# Patient Record
Sex: FEMALE | Race: BLACK OR AFRICAN AMERICAN
[De-identification: names, ages, dates, MRNs, and addresses within clinical notes are randomized per-mention and may not be internally consistent; named-entity substitution may affect disease eponyms.]

---

## 2017-01-06 ENCOUNTER — HOSPITAL ENCOUNTER (EMERGENCY)
Dept: HOSPITAL 61 - ER | Age: 42
Discharge: HOME | End: 2017-01-06
Payer: MEDICARE

## 2017-01-06 VITALS
SYSTOLIC BLOOD PRESSURE: 127 MMHG | DIASTOLIC BLOOD PRESSURE: 78 MMHG | DIASTOLIC BLOOD PRESSURE: 78 MMHG | SYSTOLIC BLOOD PRESSURE: 127 MMHG

## 2017-01-06 DIAGNOSIS — Z88.8: ICD-10-CM

## 2017-01-06 DIAGNOSIS — Z86.73: ICD-10-CM

## 2017-01-06 DIAGNOSIS — I10: ICD-10-CM

## 2017-01-06 DIAGNOSIS — E78.00: ICD-10-CM

## 2017-01-06 DIAGNOSIS — R56.9: Primary | ICD-10-CM

## 2017-01-06 DIAGNOSIS — Z88.5: ICD-10-CM

## 2017-01-06 LAB
ANION GAP SERPL CALC-SCNC: 17 MMOL/L (ref 6–14)
BACTERIA #/AREA URNS HPF: (no result) /HPF
BASOPHILS # BLD AUTO: 0.1 X10^3/UL (ref 0–0.2)
BASOPHILS NFR BLD: 1 % (ref 0–3)
BILIRUB UR QL STRIP: NEGATIVE
BUN SERPL-MCNC: 12 MG/DL (ref 7–20)
CALCIUM SERPL-MCNC: 9.3 MG/DL (ref 8.5–10.1)
CHLORIDE SERPL-SCNC: 102 MMOL/L (ref 98–107)
CO2 SERPL-SCNC: 21 MMOL/L (ref 21–32)
CREAT SERPL-MCNC: 1 MG/DL (ref 0.6–1)
EOSINOPHIL NFR BLD: 1 % (ref 0–3)
ERYTHROCYTE [DISTWIDTH] IN BLOOD BY AUTOMATED COUNT: 15.3 % (ref 11.5–14.5)
GFR SERPLBLD BASED ON 1.73 SQ M-ARVRAT: 73.9 ML/MIN
GLUCOSE SERPL-MCNC: 121 MG/DL (ref 70–99)
GLUCOSE UR STRIP-MCNC: NEGATIVE MG/DL
HCT VFR BLD CALC: 36.5 % (ref 36–47)
HGB BLD-MCNC: 11.6 G/DL (ref 12–15.5)
LYMPHOCYTES # BLD: 1.7 X10^3/UL (ref 1–4.8)
LYMPHOCYTES NFR BLD AUTO: 25 % (ref 24–48)
MAGNESIUM SERPL-MCNC: 2.2 MG/DL (ref 1.8–2.4)
MCH RBC QN AUTO: 28 PG (ref 25–35)
MCHC RBC AUTO-ENTMCNC: 32 G/DL (ref 31–37)
MCV RBC AUTO: 89 FL (ref 79–100)
MONOCYTES NFR BLD: 5 % (ref 0–9)
NEG OBC UR: (no result)
NEUTROPHILS NFR BLD AUTO: 68 % (ref 31–73)
NITRITE UR QL STRIP: NEGATIVE
PH UR STRIP: 6 [PH]
PLATELET # BLD AUTO: 445 X10^3/UL (ref 140–400)
POS OBC UR: (no result)
POTASSIUM SERPL-SCNC: 3.6 MMOL/L (ref 3.5–5.1)
PROT UR STRIP-MCNC: 30 MG/DL
RBC # BLD AUTO: 4.09 X10^6/UL (ref 3.5–5.4)
RBC #/AREA URNS HPF: (no result) /HPF (ref 0–2)
SODIUM SERPL-SCNC: 140 MMOL/L (ref 136–145)
SP GR UR STRIP: 1.02
SQUAMOUS #/AREA URNS LPF: (no result) /LPF
UROBILINOGEN UR-MCNC: 0.2 MG/DL
WBC # BLD AUTO: 6.5 X10^3/UL (ref 4–11)
WBC #/AREA URNS HPF: (no result) /HPF (ref 0–4)

## 2017-01-06 PROCEDURE — 99285 EMERGENCY DEPT VISIT HI MDM: CPT

## 2017-01-06 PROCEDURE — 87086 URINE CULTURE/COLONY COUNT: CPT

## 2017-01-06 PROCEDURE — 70450 CT HEAD/BRAIN W/O DYE: CPT

## 2017-01-06 PROCEDURE — 96361 HYDRATE IV INFUSION ADD-ON: CPT

## 2017-01-06 PROCEDURE — 93005 ELECTROCARDIOGRAM TRACING: CPT

## 2017-01-06 PROCEDURE — 85027 COMPLETE CBC AUTOMATED: CPT

## 2017-01-06 PROCEDURE — 81001 URINALYSIS AUTO W/SCOPE: CPT

## 2017-01-06 PROCEDURE — 81025 URINE PREGNANCY TEST: CPT

## 2017-01-06 PROCEDURE — 96374 THER/PROPH/DIAG INJ IV PUSH: CPT

## 2017-01-06 PROCEDURE — 83735 ASSAY OF MAGNESIUM: CPT

## 2017-01-06 PROCEDURE — 36415 COLL VENOUS BLD VENIPUNCTURE: CPT

## 2017-01-06 PROCEDURE — 80048 BASIC METABOLIC PNL TOTAL CA: CPT

## 2017-01-06 NOTE — PHYS DOC
Past Medical History


Past Medical History:  Anxiety, CVA, High Cholesterol, Hypertension, Seizure


Additional Past Medical Histor:  blood clot in brain with pregnancy


Past Surgical History:  Tubal ligation, Other


Additional Past Surgical Histo:  brain surgery 2003


Alcohol Use:  None


Drug Use:  None





Adult General


Chief Complaint


Chief Complaint:  SEIZURE





HPI


HPI


Patient is a 41  year old female who presents status post seizure. Patient is 

accompanied by her sister, who contributes to history. They both work he other; 

patient's sister says that she came into her office but was acting unusually 

and not speaking. She then had a seizure that lasted approximately 30-60 

seconds. Sister called EMS. Patient was then postictal. She does have history 

of seizures, but does not take any antiepileptic medication. Of note, she says 

that she had been taking clonazepam for stress but ran out 1 month ago. While 

she was on the clonazepam she did not have any seizures. Patient says she feels 

generally poor at this time.





Review of Systems


Review of Systems


Constitutional: Fatigue. Denies fever or chills 


Eyes: Denies change in visual acuity or eye pain 


HENT: Denies nasal congestion or sore throat 


Respiratory: Denies cough or shortness of breath 


Cardiovascular: Denies chest pain


GI: Denies abdominal pain, nausea, vomiting, bloody stools or diarrhea 


: Denies dysuria or hematuria 


Musculoskeletal: Denies back pain or joint pain 


Integument: Denies rash or skin lesions 


Neurologic: Seizure, headache. Denies focal weakness or sensory changes





Current Medications


Current Medications





 Current Medications








 Medications


  (Trade)  Dose


 Ordered  Sig/Ronan  Start Time


 Stop Time Status Last Admin


Dose Admin


 


 Acetaminophen


  (Tylenol)  1,000 mg  1X  ONCE  1/6/17 14:30


 1/6/17 14:33 DC 1/6/17 15:13


1,000 MG


 


 Lorazepam


  (Ativan)  1 mg  1X  ONCE  1/6/17 13:45


 1/6/17 13:46 DC 1/6/17 13:43


1 MG


 


 Sodium Chloride


  (Iv Sodium


 Chloride 0.9%


 1000ml Bag)  1,000 ml @ 


 1,000 mls/hr  1X  ONCE  1/6/17 13:45


 1/6/17 14:44 DC 1/6/17 13:42


1,000 MLS/HR











Allergies


Allergies





 Allergies








Coded Allergies Type Severity Reaction Last Updated Verified


 


  metoclopramide Allergy Severe Anaphylaxis 10/29/14 No


 


  morphine Allergy Intermediate itching 10/29/14 Yes











Physical Exam


Physical Exam


Constitutional: Well developed, well nourished, no acute distress, non-toxic 

appearance


HENT: Normocephalic, atraumatic, bilateral external ears normal


Eyes: PERRL, EOMI, conjunctiva normal, no discharge


Neck: Normal range of motion, no stridor


Cardiovascular: Tachycardic, regular rhythm, no murmur 


Lungs & Thorax:  Bilateral breath sounds clear to auscultation 


Abdomen: Bowel sounds normal, soft, non-distended, no TTP


Skin: Warm, dry, no erythema, no rash


Extremities: No obvious deformity, no edema


Neurologic: Alert and oriented X 3, GCS 15, CN II-XII grossly intact, strength 

intact and symmetrical throughout, sensation to light touch intact throughout, 

no dystaxia noted





Current Patient Data


Vital Signs





 Vital Signs








  Date Time  Temp Pulse Resp B/P Pulse Ox O2 Delivery O2 Flow Rate FiO2


 


1/6/17 16:15  88 20 127/78 100   


 


1/6/17 15:27      Room Air  


 


1/6/17 12:50 98.4       





 98.4       








Lab Values





 Laboratory Tests








Test


  1/6/17


13:15 1/6/17


13:39


 


Urine Collection Type Unknown   


 


Urine Color Red   


 


Urine Clarity Cloudy   


 


Urine pH 6.0   


 


Urine Specific Gravity 1.020   


 


Urine Protein


  30mg/dL


(NEG-TRACE) 


 


 


Urine Glucose (UA)


  Negativemg/dL


(NEG) 


 


 


Urine Ketones (Stick) 15mg/dL (NEG)   


 


Urine Blood Large (NEG)   


 


Urine Nitrite


  Negative (NEG)


  


 


 


Urine Bilirubin


  Negative (NEG)


  


 


 


Urine Urobilinogen Dipstick


  0.2mg/dL (0.2


mg/dL) 


 


 


Urine Leukocyte Esterase


  Moderate (NEG)


  


 


 


Urine RBC


  Tntc/HPF (0-2)


  


 


 


Urine WBC


  5-10/HPF (0-4)


  


 


 


Urine Squamous Epithelial


Cells Few/LPF  


  


 


 


Urine Bacteria


  Moderate/HPF


(0-FEW) 


 


 


Urine Mucus Mod/LPF   


 


Urine Pregnancy Test


  Negative (NEG)


  


 


 


White Blood Count


  


  6.5x10^3/uL


(4.0-11.0)


 


Red Blood Count


  


  4.09x10^6/uL


(3.50-5.40)


 


Hemoglobin


  


  11.6g/dL


(12.0-15.5)  L


 


Hematocrit


  


  36.5%


(36.0-47.0)


 


Mean Corpuscular Volume  89fL ()  


 


Mean Corpuscular Hemoglobin  28pg (25-35)  


 


Mean Corpuscular Hemoglobin


Concent 


  32g/dL (31-37)


 


 


Red Cell Distribution Width


  


  15.3%


(11.5-14.5)  H


 


Platelet Count


  


  445x10^3/uL


(140-400)  H


 


Neutrophils (%) (Auto)  68% (31-73)  


 


Lymphocytes (%) (Auto)  25% (24-48)  


 


Monocytes (%) (Auto)  5% (0-9)  


 


Eosinophils (%) (Auto)  1% (0-3)  


 


Basophils (%) (Auto)  1% (0-3)  


 


Neutrophils # (Auto)


  


  4.4x10^3uL


(1.8-7.7)


 


Lymphocytes # (Auto)


  


  1.7x10^3/uL


(1.0-4.8)


 


Monocytes # (Auto)


  


  0.3x10^3/uL


(0.0-1.1)


 


Eosinophils # (Auto)


  


  0.1x10^3/uL


(0.0-0.7)


 


Basophils # (Auto)


  


  0.1x10^3/uL


(0.0-0.2)


 


Sodium Level


  


  140mmol/L


(136-145)


 


Potassium Level


  


  3.6mmol/L


(3.5-5.1)


 


Chloride Level


  


  102mmol/L


()


 


Carbon Dioxide Level


  


  21mmol/L


(21-32)


 


Anion Gap  17 (6-14)  H


 


Blood Urea Nitrogen


  


  12mg/dL (7-20)


 


 


Creatinine


  


  1.0mg/dL


(0.6-1.0)


 


Estimated GFR


(Cockcroft-Gault) 


  73.9  


 


 


Glucose Level


  


  121mg/dL


(70-99)  H


 


Calcium Level


  


  9.3mg/dL


(8.5-10.1)


 


Magnesium Level


  


  2.2mg/dL


(1.8-2.4)





 Laboratory Tests


1/6/17 13:39








 Laboratory Tests


1/6/17 13:39











EKG


EKG


EKG (my read): sinus tachycardia, rate 113, normal axis, no acute ischemic 

changes





Radiology/Procedures


Radiology/Procedures


CT head:


IMPRESSION: Chronic changes. No acute finding. No significant change





Course & Med Decision Making


Course & Med Decision Making


Pertinent Labs and Imaging studies reviewed. (See chart for details)


Patient is 41-year-old female who presents status post seizure. Has history of 

same; may be related to cessation of clonazepam one month ago. Initially post-

ictal, mental status continues to improve while in ED. Will check labs, UA, CT 

head given c/o headache. IVF bolus, dose of ativan ordered. Labs largely 

unremarkable. UA bloody (patient on menstrual cycle) and has WBCs, few bacteria

, leuk esterase; as she does not have any UTI symptoms I will not treat at this 

time and will instead await culture. CT head results as above. I discussed 

results with patient and family; patient feeling much improved at this time 

except for fatigue. Patient discharged home with rx for clonazepam; while she 

was in the ED her sister obtained an appt with her PCP for Monday. Patient 

discharged home with instructions for follow up and return precautions.





Dragon Disclaimer


Dragon Disclaimer


This electronic medical record was generated, in whole or in part, using a 

voice recognition dictation system.





Departure


Departure


Impression:  


 Primary Impression:  


 Seizure


Disposition:  01 HOME, SELF-CARE


Condition:  IMPROVED


Referrals:  


NON,STAFF (PCP)


Patient Instructions:  Seizure, Adult





Additional Instructions:


Thank you for allowing us to provide care today in the Emergency Department.


Take the provided medication as directed. Use caution when taking this 

medication as it can make you drowsy.


Schedule a follow up appointment with your primary care doctor. 


Return promptly to the Emergency Department if you develop any new or 

concerning symptoms.


Scripts


Clonazepam 0.5 Mg Tablet0.5 Mg PO BID #20 TAB


   Prov:MICHAEL MACIAS MD         1/6/17








MICHAEL MACIAS MD Jan 6, 2017 13:27

## 2017-01-06 NOTE — EKG
[facility]

               [address] [place] [address]

Test Date:    2017               Test Time:    13:15:48

Pat Name:     ZACHARIAH BOYD            Department:   

Patient ID:   [id number]           Room:          

Gender:       F                        Technician:   

:          [id number]               Requested By: MICHAEL MACIAS

Order Number: [id number]            Reading MD:   Milagro Page

                                 Measurements

Intervals                              Axis          

Rate:         113                      P:            -100

CO:           90                       QRS:          66

QRSD:         86                       T:            33

QT:           350                                    

QTc:          486                                    

                           Interpretive Statements

SINUS TACHYCARDIA

OTHERWISE NORMAL ECG



Electronically Signed On 2017 8:46:38 CST by Milagro Page

## 2017-01-06 NOTE — RAD
Indication history of a seizure. Headache.



Noncontrast images of the head were obtained. Note is made of a previous

examination 10/29/2014.



The patient is status post left craniotomy. An acute calvarial finding is not

seen. There is an aneurysm clip in the area of the [place] similar to

the previous exam. There is an encephalomalacia area in the left parietal lobe

also similar to the prior study. No subdural or epidural hematoma is seen.

There is no mass or midline shift. An acute finding is not apparent. A

significant change compared to the prior study is not seen.



IMPRESSION: Chronic changes. No acute finding. No significant change













PQRS Compliance Statement:



One or more of the following individualized dose reduction techniques were

utilized for this examination:

1. Automated exposure control

2. Adjustment of the mA and/or kV according to patient size

3. Use of iterative reconstruction technique

## 2017-02-23 ENCOUNTER — HOSPITAL ENCOUNTER (OUTPATIENT)
Dept: HOSPITAL 61 - RT | Age: 42
Discharge: HOME | End: 2017-02-23
Attending: PSYCHIATRY & NEUROLOGY
Payer: MEDICARE

## 2017-02-23 DIAGNOSIS — R56.9: Primary | ICD-10-CM

## 2017-02-23 PROCEDURE — 95816 EEG AWAKE AND DROWSY: CPT

## 2017-03-01 NOTE — EEG
DATE OF SERVICE:  02/23/2017



EEG NUMBER:  



OBJECTIVE:  This is a 41-year-old -American female patient with history

of seizure or seizure-like episodes.  EEG was requested to evaluate seizure

activity.



METHODS:  Twenty electrodes were applied according to the international 10-20

electrode placement system.  EKG monitoring, hyperventilation, intermittent

photic stimulation, monopolar and bipolar montages are routinely utilized.  The

record was obtained on a digital system with video monitoring.



FINDINGS:



1.  Background:  The patient was recorded in awake and drowsy states.  No sleep

state was recorded.  The overall background amplitude is asymmetric 

higher in the left hemispheric area than the right hemispheric area, and it is

about 20-40 microvolts in the left hemispherical area and 10-20 microvolts in

the right hemispherical area.  A posterior dominant rhythm of 8-10 Hz is

observed.



2.  Abnormalities:  No specific epileptiform discharge or electrographic seizure

is seen.  No focal or diffuse slowing.



3.  Activation:  Hyperventilation was performed with good efforts and normal

response.  Intermittent photic stimulation was performed with photic driving. 

No specific epileptiform discharge or electrographic seizure induced by

hyperventilation or intermittent photic stimulation.



IMPRESSION:  This EEG falls into the abnormal category of the study for the

awake and drowsy states.  No sleep state was recorded.  The overall background

amplitude is asymmetric higher in the left hemispheric area than the

right hemispheric area that is in consistent with the patient's history of 

craniotomy.  No specific epileptiform discharge or electrographic seizure is

seen.  

 



______________________________

LOUISE BADILLO MD



DR:  ALICIA/alexandro  JOB#:  [id number] / [id number]

DD:  03/01/2017 13:10  DT:  03/01/2017 21:11

RENETTA

## 2017-07-08 ENCOUNTER — HOSPITAL ENCOUNTER (OUTPATIENT)
Dept: HOSPITAL 61 - ER | Age: 42
Setting detail: OBSERVATION
LOS: 2 days | Discharge: HOME | End: 2017-07-10
Attending: INTERNAL MEDICINE | Admitting: INTERNAL MEDICINE
Payer: MEDICARE

## 2017-07-08 VITALS — HEIGHT: 62 IN | WEIGHT: 293 LBS | BODY MASS INDEX: 53.92 KG/M2

## 2017-07-08 VITALS — DIASTOLIC BLOOD PRESSURE: 82 MMHG | SYSTOLIC BLOOD PRESSURE: 120 MMHG

## 2017-07-08 VITALS — DIASTOLIC BLOOD PRESSURE: 79 MMHG | SYSTOLIC BLOOD PRESSURE: 125 MMHG

## 2017-07-08 VITALS — SYSTOLIC BLOOD PRESSURE: 114 MMHG | DIASTOLIC BLOOD PRESSURE: 79 MMHG

## 2017-07-08 DIAGNOSIS — Z82.49: ICD-10-CM

## 2017-07-08 DIAGNOSIS — Z86.79: ICD-10-CM

## 2017-07-08 DIAGNOSIS — Z86.73: ICD-10-CM

## 2017-07-08 DIAGNOSIS — F41.1: ICD-10-CM

## 2017-07-08 DIAGNOSIS — R53.1: Primary | ICD-10-CM

## 2017-07-08 DIAGNOSIS — E78.00: ICD-10-CM

## 2017-07-08 DIAGNOSIS — I10: ICD-10-CM

## 2017-07-08 DIAGNOSIS — E78.5: ICD-10-CM

## 2017-07-08 DIAGNOSIS — Z83.3: ICD-10-CM

## 2017-07-08 LAB
ALBUMIN SERPL-MCNC: 3.6 G/DL (ref 3.4–5)
ALP SERPL-CCNC: 49 U/L (ref 46–116)
ALT SERPL-CCNC: 22 U/L (ref 14–59)
ANION GAP SERPL CALC-SCNC: 9 MMOL/L (ref 6–14)
APTT BLD: 29 SEC (ref 24–38)
AST SERPL-CCNC: 17 U/L (ref 15–37)
BASOPHILS # BLD AUTO: 0 X10^3/UL (ref 0–0.2)
BASOPHILS NFR BLD: 1 % (ref 0–3)
BILIRUB DIRECT SERPL-MCNC: 0.1 MG/DL (ref 0–0.2)
BILIRUB SERPL-MCNC: 0.2 MG/DL (ref 0.2–1)
BUN SERPL-MCNC: 14 MG/DL (ref 7–20)
CALCIUM SERPL-MCNC: 8.7 MG/DL (ref 8.5–10.1)
CHLORIDE SERPL-SCNC: 106 MMOL/L (ref 98–107)
CO2 SERPL-SCNC: 27 MMOL/L (ref 21–32)
CREAT SERPL-MCNC: 0.9 MG/DL (ref 0.6–1)
EOSINOPHIL NFR BLD: 1 % (ref 0–3)
ERYTHROCYTE [DISTWIDTH] IN BLOOD BY AUTOMATED COUNT: 14 % (ref 11.5–14.5)
FIBRINOGEN PPP-MCNC: 346 MG/DL (ref 200–440)
GFR SERPLBLD BASED ON 1.73 SQ M-ARVRAT: 83.5 ML/MIN
GLUCOSE SERPL-MCNC: 133 MG/DL (ref 70–99)
HCT VFR BLD CALC: 33.7 % (ref 36–47)
HGB BLD-MCNC: 11.4 G/DL (ref 12–15.5)
INR PPP: 1 (ref 0.8–1.1)
LYMPHOCYTES # BLD: 1 X10^3/UL (ref 1–4.8)
LYMPHOCYTES NFR BLD AUTO: 20 % (ref 24–48)
MCH RBC QN AUTO: 29 PG (ref 25–35)
MCHC RBC AUTO-ENTMCNC: 34 G/DL (ref 31–37)
MCV RBC AUTO: 87 FL (ref 79–100)
MONOCYTES NFR BLD: 6 % (ref 0–9)
NEUTROPHILS NFR BLD AUTO: 73 % (ref 31–73)
PLATELET # BLD AUTO: 467 X10^3/UL (ref 140–400)
POTASSIUM SERPL-SCNC: 3.6 MMOL/L (ref 3.5–5.1)
PROT SERPL-MCNC: 7.6 G/DL (ref 6.4–8.2)
PROTHROMBIN TIME: 12.8 SEC (ref 11.7–14)
RBC # BLD AUTO: 3.88 X10^6/UL (ref 3.5–5.4)
SODIUM SERPL-SCNC: 142 MMOL/L (ref 136–145)
WBC # BLD AUTO: 5.2 X10^3/UL (ref 4–11)

## 2017-07-08 PROCEDURE — G0378 HOSPITAL OBSERVATION PER HR: HCPCS

## 2017-07-08 PROCEDURE — 70551 MRI BRAIN STEM W/O DYE: CPT

## 2017-07-08 PROCEDURE — 82962 GLUCOSE BLOOD TEST: CPT

## 2017-07-08 PROCEDURE — 96374 THER/PROPH/DIAG INJ IV PUSH: CPT

## 2017-07-08 PROCEDURE — G0379 DIRECT REFER HOSPITAL OBSERV: HCPCS

## 2017-07-08 PROCEDURE — 93005 ELECTROCARDIOGRAM TRACING: CPT

## 2017-07-08 PROCEDURE — 36415 COLL VENOUS BLD VENIPUNCTURE: CPT

## 2017-07-08 PROCEDURE — 70450 CT HEAD/BRAIN W/O DYE: CPT

## 2017-07-08 PROCEDURE — 96361 HYDRATE IV INFUSION ADD-ON: CPT

## 2017-07-08 PROCEDURE — 95816 EEG AWAKE AND DROWSY: CPT

## 2017-07-08 PROCEDURE — 85730 THROMBOPLASTIN TIME PARTIAL: CPT

## 2017-07-08 PROCEDURE — 96375 TX/PRO/DX INJ NEW DRUG ADDON: CPT

## 2017-07-08 PROCEDURE — 70544 MR ANGIOGRAPHY HEAD W/O DYE: CPT

## 2017-07-08 PROCEDURE — 99285 EMERGENCY DEPT VISIT HI MDM: CPT

## 2017-07-08 PROCEDURE — 80076 HEPATIC FUNCTION PANEL: CPT

## 2017-07-08 PROCEDURE — 97161 PT EVAL LOW COMPLEX 20 MIN: CPT

## 2017-07-08 PROCEDURE — 80048 BASIC METABOLIC PNL TOTAL CA: CPT

## 2017-07-08 PROCEDURE — 85384 FIBRINOGEN ACTIVITY: CPT

## 2017-07-08 PROCEDURE — 85027 COMPLETE CBC AUTOMATED: CPT

## 2017-07-08 PROCEDURE — 85610 PROTHROMBIN TIME: CPT

## 2017-07-08 RX ADMIN — LISINOPRIL SCH MG: 10 TABLET ORAL at 17:46

## 2017-07-08 RX ADMIN — ACETAMINOPHEN PRN MG: 500 TABLET ORAL at 20:35

## 2017-07-08 NOTE — RAD
CT head without contrast    



History: Right-sided numbness, speech difficulty



Comparison: 1/6/2017.



Procedure: Axial images are obtained of the head from the skull base through

the vertex without IV contrast.





Findings: Changes of left parietal craniotomy. Aneurysm clip identified in the

region of [place] similar to prior exam. Small focus of

encephalomalacia identified in the left parietal lobe similar to prior exam. 

The ventricles and sulci are normal for the patient's age. No mass-effect,

intracranial mass, midline shift, hemorrhage or obvious acute infarction is

identified.  Basilar cisterns are patent.  . The visualized paranasal sinuses

appear clear.    



Impression: 



    1. No acute intracranial process.     



ER physician informed at time of dictation.



PQRS Compliance Statement:



One or more of the following individualized dose reduction techniques were

utilized for this examination:

1. Automated exposure control

2. Adjustment of the mA and/or kV according to patient size

3. Use of iterative reconstruction technique faint

## 2017-07-08 NOTE — PDOC1
History and Physical


Date of Admission


Date of Admission


DATE: 7/8/17 


TIME: 19:46





History of Present Illness


History of Present Illness


Young female with prior h/o CVA after childberth. She has a coil.


Now co R weakness. ?New CVA?


DW ER doc


Reviewed chart and labs and imaging.


Pt seen and examined


Plan is to admit for neuro consult and possibly more imaging


Dictation still broken


Total time 34 minutes





Past medical history


1. Generalized anxiety disorder


2. History of stroke


3. Hyperlipidemia


4. Hypertension


5. Seizure


6. History of blood clot associated with childbirth


7. Tubal ligation


8. Brain surgery 2003


9. Suicidal thoughts associated with taking Keppra





Current Problem List


Problem List


Problems


Medical Problems:


(1) Right sided weakness


Status: Acute  








Problems:  





Current Medications


Current Medications





Current Medications


Ondansetron HCl (Zofran) 4 mg 1X  ONCE IV  Last administered on 7/8/17at 11:57;

  Start 7/8/17 at 11:30;  Stop 7/8/17 at 11:31;  Status DC


Sodium Chloride 1,000 ml @  1,000 mls/hr 1X  ONCE IV  Last administered on 7/8/ 17at 11:57;  Start 7/8/17 at 11:30;  Stop 7/8/17 at 12:29;  Status DC


Ondansetron HCl (Zofran) 4 mg PRN Q8HRS  PRN IV NAUSEA/VOMITING;  Start 7/8/17 

at 12:45;  Stop 7/9/17 at 12:44


Aspirin (Amanda Aspirin) 325 mg 1X  ONCE PO  Last administered on 7/8/17at 13:03

;  Start 7/8/17 at 12:50;  Stop 7/8/17 at 12:51;  Status DC


Aspirin (Ecotrin) 81 mg DAILY PO ;  Start 7/9/17 at 09:00


Clonazepam (KlonoPIN) 0.5 mg BID PO  Last administered on 7/8/17at 17:46;  

Start 7/8/17 at 17:30


Fluoxetine HCl (PROzac) 60 mg DAILY PO  Last administered on 7/8/17at 17:46;  

Start 7/8/17 at 17:30


Lisinopril (Prinivil) 10 mg DAILY PO  Last administered on 7/8/17at 17:46;  

Start 7/8/17 at 17:30


Clonidine HCl (Catapres Tts-2) 1 patch WEEKLY TD ;  Start 7/15/17 at 09:00





Active Scripts


Active


Clonazepam 0.5 Mg Tablet 0.5 Mg PO BID


Reported


Clonidine Tts-2 (Clonidine) 1 Each Patch.tdwk 1 Patch TD WEEKLY


Aspir 81 (Aspirin) 81 Mg Tablet.dr 1 Tab PO DAILY


Lisinopril 10 Mg Tablet 1 Tab PO DAILY


Fluoxetine Hcl 20 Mg Capsule 60 Mg PO DAILY





Allergies


Allergies:  


Coded Allergies:  


     metoclopramide (Unverified  Allergy, Severe, Anaphylaxis, 10/29/14)


     morphine (Verified  Allergy, Intermediate, itching, 10/29/14)





Vitals


Vitals





Vital Signs








  Date Time  Temp Pulse Resp B/P (MAP) Pulse Ox O2 Delivery O2 Flow Rate FiO2


 


7/8/17 17:46  84  120/82    


 


7/8/17 15:04      Room Air  


 


7/8/17 13:45 99.9  16  100   





 99.9       











Labs


Labs





Laboratory Tests








Test


  7/8/17


11:20 7/8/17


11:42


 


White Blood Count


  5.2 x10^3/uL


(4.0-11.0) 


 


 


Red Blood Count


  3.88 x10^6/uL


(3.50-5.40) 


 


 


Hemoglobin


  11.4 g/dL


(12.0-15.5) 


 


 


Hematocrit


  33.7 %


(36.0-47.0) 


 


 


Mean Corpuscular Volume 87 fL ()  


 


Mean Corpuscular Hemoglobin 29 pg (25-35)  


 


Mean Corpuscular Hemoglobin


Concent 34 g/dL


(31-37) 


 


 


Red Cell Distribution Width


  14.0 %


(11.5-14.5) 


 


 


Platelet Count


  467 x10^3/uL


(140-400) 


 


 


Neutrophils (%) (Auto) 73 % (31-73)  


 


Lymphocytes (%) (Auto) 20 % (24-48)  


 


Monocytes (%) (Auto) 6 % (0-9)  


 


Eosinophils (%) (Auto) 1 % (0-3)  


 


Basophils (%) (Auto) 1 % (0-3)  


 


Neutrophils # (Auto)


  3.8 x10^3uL


(1.8-7.7) 


 


 


Lymphocytes # (Auto)


  1.0 x10^3/uL


(1.0-4.8) 


 


 


Monocytes # (Auto)


  0.3 x10^3/uL


(0.0-1.1) 


 


 


Eosinophils # (Auto)


  0.0 x10^3/uL


(0.0-0.7) 


 


 


Basophils # (Auto)


  0.0 x10^3/uL


(0.0-0.2) 


 


 


Prothrombin Time


  12.8 SEC


(11.7-14.0) 


 


 


Prothromb Time International


Ratio 1.0 (0.8-1.1) 


  


 


 


Activated Partial


Thromboplast Time 29 SEC (24-38) 


  


 


 


Fibrinogen


  346 mg/dL


(200-440) 


 


 


Sodium Level


  142 mmol/L


(136-145) 


 


 


Potassium Level


  3.6 mmol/L


(3.5-5.1) 


 


 


Chloride Level


  106 mmol/L


() 


 


 


Carbon Dioxide Level


  27 mmol/L


(21-32) 


 


 


Anion Gap 9 (6-14)  


 


Blood Urea Nitrogen


  14 mg/dL


(7-20) 


 


 


Creatinine


  0.9 mg/dL


(0.6-1.0) 


 


 


Estimated GFR


(Cockcroft-Gault) 83.5 


  


 


 


Glucose Level


  133 mg/dL


(70-99) 


 


 


Calcium Level


  8.7 mg/dL


(8.5-10.1) 


 


 


Total Bilirubin


  0.2 mg/dL


(0.2-1.0) 


 


 


Direct Bilirubin


  0.1 mg/dL


(0.0-0.2) 


 


 


Aspartate Amino Transf


(AST/SGOT) 17 U/L (15-37) 


  


 


 


Alanine Aminotransferase


(ALT/SGPT) 22 U/L (14-59) 


  


 


 


Alkaline Phosphatase


  49 U/L


() 


 


 


Total Protein


  7.6 g/dL


(6.4-8.2) 


 


 


Albumin


  3.6 g/dL


(3.4-5.0) 


 


 


Glucose (Fingerstick)


  


  115 mg/dL


(70-99)








Laboratory Tests








Test


  7/8/17


11:20 7/8/17


11:42


 


White Blood Count


  5.2 x10^3/uL


(4.0-11.0) 


 


 


Red Blood Count


  3.88 x10^6/uL


(3.50-5.40) 


 


 


Hemoglobin


  11.4 g/dL


(12.0-15.5) 


 


 


Hematocrit


  33.7 %


(36.0-47.0) 


 


 


Mean Corpuscular Volume 87 fL ()  


 


Mean Corpuscular Hemoglobin 29 pg (25-35)  


 


Mean Corpuscular Hemoglobin


Concent 34 g/dL


(31-37) 


 


 


Red Cell Distribution Width


  14.0 %


(11.5-14.5) 


 


 


Platelet Count


  467 x10^3/uL


(140-400) 


 


 


Neutrophils (%) (Auto) 73 % (31-73)  


 


Lymphocytes (%) (Auto) 20 % (24-48)  


 


Monocytes (%) (Auto) 6 % (0-9)  


 


Eosinophils (%) (Auto) 1 % (0-3)  


 


Basophils (%) (Auto) 1 % (0-3)  


 


Neutrophils # (Auto)


  3.8 x10^3uL


(1.8-7.7) 


 


 


Lymphocytes # (Auto)


  1.0 x10^3/uL


(1.0-4.8) 


 


 


Monocytes # (Auto)


  0.3 x10^3/uL


(0.0-1.1) 


 


 


Eosinophils # (Auto)


  0.0 x10^3/uL


(0.0-0.7) 


 


 


Basophils # (Auto)


  0.0 x10^3/uL


(0.0-0.2) 


 


 


Prothrombin Time


  12.8 SEC


(11.7-14.0) 


 


 


Prothromb Time International


Ratio 1.0 (0.8-1.1) 


  


 


 


Activated Partial


Thromboplast Time 29 SEC (24-38) 


  


 


 


Fibrinogen


  346 mg/dL


(200-440) 


 


 


Sodium Level


  142 mmol/L


(136-145) 


 


 


Potassium Level


  3.6 mmol/L


(3.5-5.1) 


 


 


Chloride Level


  106 mmol/L


() 


 


 


Carbon Dioxide Level


  27 mmol/L


(21-32) 


 


 


Anion Gap 9 (6-14)  


 


Blood Urea Nitrogen


  14 mg/dL


(7-20) 


 


 


Creatinine


  0.9 mg/dL


(0.6-1.0) 


 


 


Estimated GFR


(Cockcroft-Gault) 83.5 


  


 


 


Glucose Level


  133 mg/dL


(70-99) 


 


 


Calcium Level


  8.7 mg/dL


(8.5-10.1) 


 


 


Total Bilirubin


  0.2 mg/dL


(0.2-1.0) 


 


 


Direct Bilirubin


  0.1 mg/dL


(0.0-0.2) 


 


 


Aspartate Amino Transf


(AST/SGOT) 17 U/L (15-37) 


  


 


 


Alanine Aminotransferase


(ALT/SGPT) 22 U/L (14-59) 


  


 


 


Alkaline Phosphatase


  49 U/L


() 


 


 


Total Protein


  7.6 g/dL


(6.4-8.2) 


 


 


Albumin


  3.6 g/dL


(3.4-5.0) 


 


 


Glucose (Fingerstick)


  


  115 mg/dL


(70-99)











VTE Prophylaxis Ordered


VTE Prophylaxis Devices:  Yes


VTE Pharmacological Prophylaxi:  Yes











BEVERLY CONROY III, DO Jul 8, 2017 19:48

## 2017-07-08 NOTE — PHYS DOC
Past Medical History


Past Medical History:  Anxiety, CVA, High Cholesterol, Hypertension, Seizure


Additional Past Medical Histor:  blood clot in brain with pregnancy


Past Surgical History:  Tubal ligation, Other


Additional Past Surgical Histo:  brain surgery 


Alcohol Use:  None


Drug Use:  None





Adult General


Chief Complaint


Chief Complaint:  NEURO SYMPTOMS/DEFICITS





[facility]


HPI





Patient is a 41  year old -American American female who presents with 

with right-sided facial numbness, right arm and leg numbness and weakness. She 

states it all started around 5 PM last night. She states been persistent. She 

also states she's been feeling nauseated and lightheaded and dizzy. She denies 

any vertigo type symptoms. She states last night she did have some vertigo 

symptoms but not today. She's also been having some loose stools and had 3 or 4 

nonbloody loose stools this morning. She states that she is on 81 mg aspirin a 

day sure she took it this morning. She states she's had a blood clot in her 

brain before and had have a craniotomy this was approximately 12-13 years ago 

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 She also 

states last year she had a seizure that was related to stress. She states she 

seen Dr. aWrren last her for her seizure. She denies any headache, neck stiffness, 

fever or chills abdominal pain.





Review of Systems


Review of Systems





Constitutional: Denies fever or chills []


Eyes: Denies change in visual acuity, redness, or eye pain []


HENT: Denies nasal congestion or sore throat []


Respiratory: Denies cough or shortness of breath []


Cardiovascular: No additional information not addressed in HPI []


GI: Denies abdominal pain, nausea, vomiting, bloody stools or diarrhea []


: Denies dysuria or hematuria []


Musculoskeletal: Denies back pain or joint pain []


Integument: Denies rash or skin lesions []


Neurologic: Denies headache, positive for right-sided weakness and decreased 

sensation


Endocrine: Denies polyuria or polydipsia []





Current Medications


Current Medications





Current Medications








 Medications


  (Trade)  Dose


 Ordered  Sig/Ronan  Start Time


 Stop Time Status Last Admin


Dose Admin


 


 Ondansetron HCl


  (Zofran)  4 mg  1X  ONCE  17 11:30


 17 11:31 DC 17 11:57


4 MG


 


 Sodium Chloride  1,000 ml @ 


 1,000 mls/hr  1X  ONCE  17 11:30


 17 12:29 DC 17 11:57


1,000 MLS/HR











Allergies


Allergies





Allergies








Coded Allergies Type Severity Reaction Last Updated Verified


 


  metoclopramide Allergy Severe Anaphylaxis 10/29/14 No


 


  morphine Allergy Intermediate itching 10/29/14 Yes











Physical Exam


Physical Exam





Constitutional: Well developed, well nourished, no acute distress, non-toxic 

appearance. []


HENT: Normocephalic, atraumatic, bilateral external ears normal, oropharynx 

moist, no oral exudates, nose normal. []


Eyes: PERRLA, EOMI, conjunctiva normal, no discharge. [] 


Neck: Normal range of motion, no tenderness, supple, no stridor. [] 


Cardiovascular:Heart rate regular rhythm, no murmur []


Lungs & Thorax:  Bilateral breath sounds clear to auscultation []


Abdomen: Bowel sounds normal, soft, no tenderness, no masses, no pulsatile 

masses. [] 


Skin: Warm, dry, no erythema, no rash. [] 


Back: No tenderness, no CVA tenderness. [] 


Extremities: No tenderness, no cyanosis, no clubbing, ROM intact, no edema. [] 


Neurologic: Alert and oriented X 3, 4-5 strength in the right upper and lower 

extremity, decreased sensation to the right upper and lower extremity and face


Psychologic: Affect normal, judgement normal, mood normal. []





Current Patient Data


Vital Signs





 Vital Signs








  Date Time  Temp Pulse Resp B/P (MAP) Pulse Ox O2 Delivery O2 Flow Rate FiO2


 


17 11:05 99.2 114 16 146/85 (105) 98 Room Air  





 99.2       








Lab Values





 Laboratory Tests








Test


  17


11:20 17


11:42


 


White Blood Count


  5.2 x10^3/uL


(4.0-11.0) 


 


 


Red Blood Count


  3.88 x10^6/uL


(3.50-5.40) 


 


 


Hemoglobin


  11.4 g/dL


(12.0-15.5)  L 


 


 


Hematocrit


  33.7 %


(36.0-47.0)  L 


 


 


Mean Corpuscular Volume


  87 fL ()


  


 


 


Mean Corpuscular Hemoglobin 29 pg (25-35)   


 


Mean Corpuscular Hemoglobin


Concent 34 g/dL


(31-37) 


 


 


Red Cell Distribution Width


  14.0 %


(11.5-14.5) 


 


 


Platelet Count


  467 x10^3/uL


(140-400)  H 


 


 


Neutrophils (%) (Auto) 73 % (31-73)   


 


Lymphocytes (%) (Auto) 20 % (24-48)  L 


 


Monocytes (%) (Auto) 6 % (0-9)   


 


Eosinophils (%) (Auto) 1 % (0-3)   


 


Basophils (%) (Auto) 1 % (0-3)   


 


Neutrophils # (Auto)


  3.8 x10^3uL


(1.8-7.7) 


 


 


Lymphocytes # (Auto)


  1.0 x10^3/uL


(1.0-4.8) 


 


 


Monocytes # (Auto)


  0.3 x10^3/uL


(0.0-1.1) 


 


 


Eosinophils # (Auto)


  0.0 x10^3/uL


(0.0-0.7) 


 


 


Basophils # (Auto)


  0.0 x10^3/uL


(0.0-0.2) 


 


 


Prothrombin Time


  12.8 SEC


(11.7-14.0) 


 


 


Prothrombin Time INR 1.0 (0.8-1.1)   


 


PTT


  29 SEC (24-38)


  


 


 


Fibrinogen


  346 mg/dL


(200-440) 


 


 


Sodium Level


  142 mmol/L


(136-145) 


 


 


Potassium Level


  3.6 mmol/L


(3.5-5.1) 


 


 


Chloride Level


  106 mmol/L


() 


 


 


Carbon Dioxide Level


  27 mmol/L


(21-32) 


 


 


Anion Gap 9 (6-14)   


 


Blood Urea Nitrogen


  14 mg/dL


(7-20) 


 


 


Creatinine


  0.9 mg/dL


(0.6-1.0) 


 


 


Estimated GFR


(Cockcroft-Gault) 83.5  


  


 


 


Glucose Level


  133 mg/dL


(70-99)  H 


 


 


Calcium Level


  8.7 mg/dL


(8.5-10.1) 


 


 


Total Bilirubin


  0.2 mg/dL


(0.2-1.0) 


 


 


Direct Bilirubin


  0.1 mg/dL


(0.0-0.2) 


 


 


Aspartate Amino Transferase


(AST) 17 U/L (15-37)


  


 


 


Alanine Aminotransferase (ALT)


  22 U/L (14-59)


  


 


 


Alkaline Phosphatase


  49 U/L


() 


 


 


Total Protein


  7.6 g/dL


(6.4-8.2) 


 


 


Albumin


  3.6 g/dL


(3.4-5.0) 


 


 


Glucose (Fingerstick)


  


  115 mg/dL


(70-99)  H





 Laboratory Tests


17 11:20








 Laboratory Tests


17 11:20











EKG


EKG


EKG shows sinus rhythm with rate of 99 bpm without any ST elevations or T-wave 

inversions appreciated, normal axis,  ms, as interpreted by me.





Radiology/Procedures


Radiology/Procedures


 [facility]


 [address] [place] [address]


 [phone]


 


 IMAGING REPORT





 Signed





PATIENT: ZACHARIAH BOYD ACCOUNT: [id number] MRN#: [id number]


: 1975 LOCATION: ER AGE: 41


SEX: F EXAM DT: 17 ACCESSION#: [id number]


STATUS: PRE ER ORD. PHYSICIAN: RASTA VALDEZ MD 


REASON: stroke protocol


PROCEDURE: CT CODE STROKE HEAD WO








CT head without contrast    





History: Right-sided numbness, speech difficulty





Comparison: 2017.





Procedure: Axial images are obtained of the head from the skull base through


the vertex without IV contrast.








Findings: Changes of left parietal craniotomy. Aneurysm clip identified in the


region of [place] similar to prior exam. Small focus of


encephalomalacia identified in the left parietal lobe similar to prior exam. 


The ventricles and sulci are normal for the patient's age. No mass-effect,


intracranial mass, midline shift, hemorrhage or obvious acute infarction is


identified.  Basilar cisterns are patent.  . The visualized paranasal sinuses


appear clear.    





Impression: 





    1. No acute intracranial process.     





ER physician informed at time of dictation.





PQRS Compliance Statement:





One or more of the following individualized dose reduction techniques were


utilized for this examination:


1. Automated exposure control


2. Adjustment of the mA and/or kV according to patient size


3. Use of iterative reconstruction technique faint














DICTATED and SIGNED BY:     GAYLE LOWE MD


DATE:     17 1140





CC: ANDERSON DILLARD; RASTA VALDEZ MD ~


Impressions:


Right arm, leg weakness


Right Arm and leg decreased sensation





Course & Med Decision Making


Course & Med Decision Making


Pertinent Labs and Imaging studies reviewed. (See chart for details)








CT scan of the head did not show any acute abnormality's. Labs are nonacute. We 

are waiting a urine at this time. Spoke with Dr. Hanna with neurology and 

informed him of her symptoms, duration of symptoms since 5 PM, her past medical 

history, her previous craniotomy and coiling. I have started the patient on 325 

aspirin and ordered an MRI of her brain. She's being admitted to Dr. Kessler in 

stable condition at this time. Interim orders have been written.





Dragon Disclaimer


Dragon Disclaimer


This electronic medical record was generated, in whole or in part, using a 

voice recognition dictation system.





Departure


Departure


Impression:  


 Primary Impression:  


 Right sided weakness


Disposition:   ADMITTED AS INPATIENT


Admitting Physician:  Jamal Kessler


Condition:  STABLE


Referrals:  


ANDERSON DILLARD (PCP)











RASTA VALDEZ MD 2017 11:06

## 2017-07-08 NOTE — EKG
[facility]

              [address] [place] [address]

Test Date:    2017               Test Time:    11:21:25

Pat Name:     ZACHARIAH BOYD            Department:   

Patient ID:   [id number]           Room:          

Gender:       F                        Technician:   

:          1975               Requested By: RASTA VALDEZ

Order Number: [id number]            Reading MD:     

                                 Measurements

Intervals                              Axis          

Rate:         99                       P:            41

MA:           106                      QRS:          47

QRSD:         80                       T:            47

QT:           318                                    

QTc:          413                                    

                           Interpretive Statements

SINUS RHYTHM

NON SPECIFIC T ABNORMALITY

RI6.01          Unconfirmed report

No previous ECG available for comparison

## 2017-07-08 NOTE — PDOC2
NEUROLOGY CONSULT


Date of Admission


Date of Admission


DATE: 7/8/17 


TIME: 15:40








Reason for consultation


New onset right-sided weakness and numbness





History of present illness


Nayely Hernandez is a pleasant 41-year-old woman who had a hemorrhagic stroke 14 

years ago with the birth of her son. This was in the left hemisphere and caused 

right-sided symptoms which eventually resolved. She reports she had a 

craniotomy. She also had an aneurysm coil but it sounds like he did not 

rupture. She is done fairly well in the interim. Yesterday she was driving and 

became very dizzy. She stopped at [employer]. She felt like she would 

almost faint. She went into the store and noticed her right side was numb. This 

lasted all night long. She was nauseated and having a spinning sensation. The 

numbness persisted today and she was having diarrhea as well. This has not been 

associated with any fever. Several days ago she woke up feeling like she was 

having a seizure. She previously was evaluated by Dr. Warren, a neurologist, where 

an EEG was negative. She was placed on levetiracetam but experienced adverse 

effects with suicidal thoughts so discontinued the medication. It is not clear 

that these actually had a definite seizure. She describes spells at night rib 

feels like her eyes are jerky but she is awake during the spell. She's also 

been experiencing daily headache although does not have much of a headache 

today. She's had palpitations and then has anxiety attacks. She is been using 

Klonopin on a daily basis but the anxiety is not controlled.





Past medical history


1. Generalized anxiety disorder


2. History of stroke


3. Hyperlipidemia


4. Hypertension


5. Seizure


6. History of blood clot associated with childbirth


7. Tubal ligation


8. Brain surgery 2003


9. Suicidal thoughts associated with taking Keppra





Family history


Hypertension, high cholesterol, cancer and diabetes run in the family.





Social history


She has a boyfriend at bedside with him she's been with for 2 months. She has 3 

sons age 21 down to 14 years. Her 18-year-old son is preparing to go to 

college. Her 21-year-old son works and goes to school. She is a lifelong 

nonsmoker. She drinks occasional glasses of wine. She denies recreational 

drugs. She does not work and is on disability.





Current Medications


Current Medications





Current Medications


Ondansetron HCl (Zofran) 4 mg 1X  ONCE IV  Last administered on 7/8/17at 11:57;

  Start 7/8/17 at 11:30;  Stop 7/8/17 at 11:31;  Status DC


Sodium Chloride 1,000 ml @  1,000 mls/hr 1X  ONCE IV  Last administered on 7/8/ 17at 11:57;  Start 7/8/17 at 11:30;  Stop 7/8/17 at 12:29;  Status DC


Ondansetron HCl (Zofran) 4 mg PRN Q8HRS  PRN IV NAUSEA/VOMITING;  Start 7/8/17 

at 12:45;  Stop 7/9/17 at 12:44


Aspirin (Amanda Aspirin) 325 mg 1X  ONCE PO  Last administered on 7/8/17at 13:03

;  Start 7/8/17 at 12:50;  Stop 7/8/17 at 12:51;  Status DC





Active Scripts


Active


Clonazepam 0.5 Mg Tablet 0.5 Mg PO BID


Reported


Aspir 81 (Aspirin) 81 Mg Tablet.dr 1 Tab PO DAILY


Lisinopril 10 Mg Tablet 1 Tab PO DAILY


Fluoxetine Hcl 20 Mg Capsule 60 Mg PO DAILY


Clonidine Hcl 0.1 Mg Tablet 0.2 Mg TOP CHANGE EVERY MONDAY





Allergies


Allergies:  


Coded Allergies:  


     metoclopramide (Unverified  Allergy, Severe, Anaphylaxis, 10/29/14)


     morphine (Verified  Allergy, Intermediate, itching, 10/29/14)





ROS


Review of System


She has been experiencing daily headache although does not have a headache 

today. She's had dizziness. There's been no loss of hearing or vision. She does 

not have nose or sinus difficulty. She feels she is having trouble getting out 

her thoughts. She's been able to eat and swallow. She did have diarrhea 

earlier. She's had some nausea. She has not had shortness of breath, cough or 

cold. There is no chest or abdominal pain. She does not have any bone or joint 

pain. She has not had fever or rash. She does not have any genitourinary 

complaints. She complains of right-sided numbness involving face, arm and leg. 

She feels her right side is weakened. She has anxiety and panic attacks. She 

has been under much more stress lately. She does not complain of swelling, 

bruising or bleeding.





Physical Exam


Physical Examination


She was alert, awake and cooperative. The speech was fluent and clear. She had 

intermittent difficulty getting out her thoughts but other time she was quite 

fluent.    She had a good fund of recent and remote knowledge. Attention and 

concentration was intact. She was well-groomed and well-nourished. She was 

fully oriented. Examination of the cranial nerves revealed visual fields were 

full to confrontation. Extraocular movements were intact. The eyes were 

conjugate. Pursuit movements were smooth and saccadic movements were without 

dysmetria. The pupils were 4 millimeters and reacted to light. There was no 

afferent pupillary defect. Funduscopic examination did not reveal papilledema, 

exudate or hemorrhage. Facial sensation was intact. The muscles of mastication 

and facial expression were powerful symmetrically. Hearing was intact to finger 

rub. The palate arch symmetrically and the tongue was midline with full range 

of motion. Sternocleidomastoid and trapezius were powerful bilaterally. Muscle 

bulk and tone was normal. There was no arm drift or abnormal movement. The 

power was full and symmetric in the upper and lower extremities with give way 

weakness on the right side. The power fluctuated greatly on the right side. 

Reflexes were 2/4 and symmetric in the upper and lower extremities. The toes 

were downgoing bilaterally. Coordination testing with finger to nose, heel to 

shin, fine motor and rapid alternating movements was well performed except 

right heel-to-shin was diminished. The sensory examination was intact to pain, 

light touch, proprioception, graphesthesia, cold thermal and vibration except 

for subjective sensory shading on the right. There was no extinction to double 

simultaneous stimulation. The gait was of a normal base and unsteady walk. She 

was able to heel, toe, and tandem walk. The Romberg stance was negative. 

Auscultation of the carotid arteries did not reveal a bruit. Heart rhythm was 

regular without a murmur. Peripheral pulses are 2/4 at the wrists and feet. 

There was no edema or cyanosis of the extremities.





Vitals


VITALS





Vital Signs








  Date Time  Temp Pulse Resp B/P (MAP) Pulse Ox O2 Delivery O2 Flow Rate FiO2


 


7/8/17 15:04      Room Air  


 


7/8/17 13:45 99.9 84 16 120/82 (95) 100   





 99.9       











Labs


Labs





Laboratory Tests








Test


  7/8/17


11:20 7/8/17


11:42


 


White Blood Count


  5.2 x10^3/uL


(4.0-11.0) 


 


 


Red Blood Count


  3.88 x10^6/uL


(3.50-5.40) 


 


 


Hemoglobin


  11.4 g/dL


(12.0-15.5) 


 


 


Hematocrit


  33.7 %


(36.0-47.0) 


 


 


Mean Corpuscular Volume 87 fL ()  


 


Mean Corpuscular Hemoglobin 29 pg (25-35)  


 


Mean Corpuscular Hemoglobin


Concent 34 g/dL


(31-37) 


 


 


Red Cell Distribution Width


  14.0 %


(11.5-14.5) 


 


 


Platelet Count


  467 x10^3/uL


(140-400) 


 


 


Neutrophils (%) (Auto) 73 % (31-73)  


 


Lymphocytes (%) (Auto) 20 % (24-48)  


 


Monocytes (%) (Auto) 6 % (0-9)  


 


Eosinophils (%) (Auto) 1 % (0-3)  


 


Basophils (%) (Auto) 1 % (0-3)  


 


Neutrophils # (Auto)


  3.8 x10^3uL


(1.8-7.7) 


 


 


Lymphocytes # (Auto)


  1.0 x10^3/uL


(1.0-4.8) 


 


 


Monocytes # (Auto)


  0.3 x10^3/uL


(0.0-1.1) 


 


 


Eosinophils # (Auto)


  0.0 x10^3/uL


(0.0-0.7) 


 


 


Basophils # (Auto)


  0.0 x10^3/uL


(0.0-0.2) 


 


 


Prothrombin Time


  12.8 SEC


(11.7-14.0) 


 


 


Prothromb Time International


Ratio 1.0 (0.8-1.1) 


  


 


 


Activated Partial


Thromboplast Time 29 SEC (24-38) 


  


 


 


Fibrinogen


  346 mg/dL


(200-440) 


 


 


Sodium Level


  142 mmol/L


(136-145) 


 


 


Potassium Level


  3.6 mmol/L


(3.5-5.1) 


 


 


Chloride Level


  106 mmol/L


() 


 


 


Carbon Dioxide Level


  27 mmol/L


(21-32) 


 


 


Anion Gap 9 (6-14)  


 


Blood Urea Nitrogen


  14 mg/dL


(7-20) 


 


 


Creatinine


  0.9 mg/dL


(0.6-1.0) 


 


 


Estimated GFR


(Cockcroft-Gault) 83.5 


  


 


 


Glucose Level


  133 mg/dL


(70-99) 


 


 


Calcium Level


  8.7 mg/dL


(8.5-10.1) 


 


 


Total Bilirubin


  0.2 mg/dL


(0.2-1.0) 


 


 


Direct Bilirubin


  0.1 mg/dL


(0.0-0.2) 


 


 


Aspartate Amino Transf


(AST/SGOT) 17 U/L (15-37) 


  


 


 


Alanine Aminotransferase


(ALT/SGPT) 22 U/L (14-59) 


  


 


 


Alkaline Phosphatase


  49 U/L


() 


 


 


Total Protein


  7.6 g/dL


(6.4-8.2) 


 


 


Albumin


  3.6 g/dL


(3.4-5.0) 


 


 


Glucose (Fingerstick)


  


  115 mg/dL


(70-99)








Laboratory Tests








Test


  7/8/17


11:20 7/8/17


11:42


 


White Blood Count


  5.2 x10^3/uL


(4.0-11.0) 


 


 


Red Blood Count


  3.88 x10^6/uL


(3.50-5.40) 


 


 


Hemoglobin


  11.4 g/dL


(12.0-15.5) 


 


 


Hematocrit


  33.7 %


(36.0-47.0) 


 


 


Mean Corpuscular Volume 87 fL ()  


 


Mean Corpuscular Hemoglobin 29 pg (25-35)  


 


Mean Corpuscular Hemoglobin


Concent 34 g/dL


(31-37) 


 


 


Red Cell Distribution Width


  14.0 %


(11.5-14.5) 


 


 


Platelet Count


  467 x10^3/uL


(140-400) 


 


 


Neutrophils (%) (Auto) 73 % (31-73)  


 


Lymphocytes (%) (Auto) 20 % (24-48)  


 


Monocytes (%) (Auto) 6 % (0-9)  


 


Eosinophils (%) (Auto) 1 % (0-3)  


 


Basophils (%) (Auto) 1 % (0-3)  


 


Neutrophils # (Auto)


  3.8 x10^3uL


(1.8-7.7) 


 


 


Lymphocytes # (Auto)


  1.0 x10^3/uL


(1.0-4.8) 


 


 


Monocytes # (Auto)


  0.3 x10^3/uL


(0.0-1.1) 


 


 


Eosinophils # (Auto)


  0.0 x10^3/uL


(0.0-0.7) 


 


 


Basophils # (Auto)


  0.0 x10^3/uL


(0.0-0.2) 


 


 


Prothrombin Time


  12.8 SEC


(11.7-14.0) 


 


 


Prothromb Time International


Ratio 1.0 (0.8-1.1) 


  


 


 


Activated Partial


Thromboplast Time 29 SEC (24-38) 


  


 


 


Fibrinogen


  346 mg/dL


(200-440) 


 


 


Sodium Level


  142 mmol/L


(136-145) 


 


 


Potassium Level


  3.6 mmol/L


(3.5-5.1) 


 


 


Chloride Level


  106 mmol/L


() 


 


 


Carbon Dioxide Level


  27 mmol/L


(21-32) 


 


 


Anion Gap 9 (6-14)  


 


Blood Urea Nitrogen


  14 mg/dL


(7-20) 


 


 


Creatinine


  0.9 mg/dL


(0.6-1.0) 


 


 


Estimated GFR


(Cockcroft-Gault) 83.5 


  


 


 


Glucose Level


  133 mg/dL


(70-99) 


 


 


Calcium Level


  8.7 mg/dL


(8.5-10.1) 


 


 


Total Bilirubin


  0.2 mg/dL


(0.2-1.0) 


 


 


Direct Bilirubin


  0.1 mg/dL


(0.0-0.2) 


 


 


Aspartate Amino Transf


(AST/SGOT) 17 U/L (15-37) 


  


 


 


Alanine Aminotransferase


(ALT/SGPT) 22 U/L (14-59) 


  


 


 


Alkaline Phosphatase


  49 U/L


() 


 


 


Total Protein


  7.6 g/dL


(6.4-8.2) 


 


 


Albumin


  3.6 g/dL


(3.4-5.0) 


 


 


Glucose (Fingerstick)


  


  115 mg/dL


(70-99)











Assessment/Plan


Assessment/Plan


Alysha Hernandez is a pleasant 41-year-old woman who previously suffered with an 

intracranial hemorrhage associated with childbirth. It sounds as if she 

required surgical intervention. She also had coiling of an aneurysm. It does 

not sound as if the aneurysm actually ever ruptured. I feel the current 

neurologic symptoms are likely due to stress and not stroke or a new 

intracranial process. I'm relieved that the CT scan of the head was negative. I 

will arrange for an MRI of the brain to evaluate for acute stroke as well as an 

MRA of the intracranial arteries to evaluate for the development of new 

aneurysm. If these are negative then I would switch the emphasis to treating 

the anxiety disorder. She may need to work with a psychiatrist as Klonopin and 

Prozac do not seem to be effective at controlling the symptoms. She may be well 

served by working with a counselor as well. I will be happy to reevaluate. I 

appreciate being involved in her care.











SANDY MCGHEE MD Jul 8, 2017 15:49

## 2017-07-09 VITALS — DIASTOLIC BLOOD PRESSURE: 71 MMHG | SYSTOLIC BLOOD PRESSURE: 107 MMHG

## 2017-07-09 VITALS — SYSTOLIC BLOOD PRESSURE: 131 MMHG | DIASTOLIC BLOOD PRESSURE: 85 MMHG

## 2017-07-09 VITALS — SYSTOLIC BLOOD PRESSURE: 121 MMHG | DIASTOLIC BLOOD PRESSURE: 85 MMHG

## 2017-07-09 VITALS — SYSTOLIC BLOOD PRESSURE: 112 MMHG | DIASTOLIC BLOOD PRESSURE: 69 MMHG

## 2017-07-09 VITALS — SYSTOLIC BLOOD PRESSURE: 109 MMHG | DIASTOLIC BLOOD PRESSURE: 69 MMHG

## 2017-07-09 LAB
ANION GAP SERPL CALC-SCNC: 9 MMOL/L (ref 6–14)
BASOPHILS # BLD AUTO: 0 X10^3/UL (ref 0–0.2)
BASOPHILS NFR BLD: 1 % (ref 0–3)
BUN SERPL-MCNC: 7 MG/DL (ref 7–20)
CALCIUM SERPL-MCNC: 8.5 MG/DL (ref 8.5–10.1)
CHLORIDE SERPL-SCNC: 104 MMOL/L (ref 98–107)
CO2 SERPL-SCNC: 26 MMOL/L (ref 21–32)
CREAT SERPL-MCNC: 0.7 MG/DL (ref 0.6–1)
EOSINOPHIL NFR BLD: 3 % (ref 0–3)
ERYTHROCYTE [DISTWIDTH] IN BLOOD BY AUTOMATED COUNT: 14.4 % (ref 11.5–14.5)
GFR SERPLBLD BASED ON 1.73 SQ M-ARVRAT: 111.6 ML/MIN
GLUCOSE SERPL-MCNC: 89 MG/DL (ref 70–99)
HCT VFR BLD CALC: 31 % (ref 36–47)
HGB BLD-MCNC: 10.1 G/DL (ref 12–15.5)
LYMPHOCYTES # BLD: 1.6 X10^3/UL (ref 1–4.8)
LYMPHOCYTES NFR BLD AUTO: 36 % (ref 24–48)
MCH RBC QN AUTO: 29 PG (ref 25–35)
MCHC RBC AUTO-ENTMCNC: 33 G/DL (ref 31–37)
MCV RBC AUTO: 89 FL (ref 79–100)
MONOCYTES NFR BLD: 8 % (ref 0–9)
NEUTROPHILS NFR BLD AUTO: 52 % (ref 31–73)
PLATELET # BLD AUTO: 361 X10^3/UL (ref 140–400)
POTASSIUM SERPL-SCNC: 3.6 MMOL/L (ref 3.5–5.1)
RBC # BLD AUTO: 3.47 X10^6/UL (ref 3.5–5.4)
SODIUM SERPL-SCNC: 139 MMOL/L (ref 136–145)
WBC # BLD AUTO: 4.5 X10^3/UL (ref 4–11)

## 2017-07-09 RX ADMIN — ACETAMINOPHEN PRN MG: 500 TABLET ORAL at 13:51

## 2017-07-09 RX ADMIN — ACETAMINOPHEN PRN MG: 500 TABLET ORAL at 08:56

## 2017-07-09 RX ADMIN — HYDROCODONE BITARTRATE AND ACETAMINOPHEN PRN TAB: 5; 325 TABLET ORAL at 20:47

## 2017-07-09 RX ADMIN — HYDROCODONE BITARTRATE AND ACETAMINOPHEN PRN TAB: 5; 325 TABLET ORAL at 14:55

## 2017-07-09 RX ADMIN — LISINOPRIL SCH MG: 10 TABLET ORAL at 08:55

## 2017-07-09 RX ADMIN — ASPIRIN SCH MG: 81 TABLET, COATED ORAL at 08:56

## 2017-07-09 NOTE — PDOC3
Discharge Summary*


Admitting Diagnosis


Problems


Medical Problems:


(1) Right sided weakness


Status: Acute  








Problems:  


Final Diagnosis


Date of discharge: 7/10/2017





Final diagnosis: Previous history of CVA, and a cerebral aneurysms with 

coiling. #2 hypertension #3 anxiety. #4 possible migraine phenomena and





Brief hospital course a 41-year-old female patient presents to the ER with 

complaints of right-sided upper extremity weakness and lower extremity weakness

, she has been placed in neurologist abdomen unit, evaluated by neurology 

patient underwent MR A and MRI of brain, no acute new findings observed. Most 

of her symptoms may be contributed to proceed with anxiety patient may need 

further evaluation by psychiatric as an outpatient basis. At this time she 

deemed stable enough to go home and follow up with primary care doctor. 

Neurology recommended patient to try Topamax as an outpatient basis and follow-

up with Dr. elizondo





GENERAL:       No apparent distress. Alert and oriented 3


HEENT:            Head normocephalic, atraumatic.


NECK:              Supple


LUNGS:            Clear to auscultation.


HEART:            RRR, S1, S2 present, pulses intact


ABDOMEN:       Soft, positive bowel sounds.


CNS: Alert oriented 3 no obvious physical deformities seen, strength is intact 

in bilateral upper and lower extremities..





Discharge disposition home





Discharge condition stable





Follow-up with primary care doctor in 2-4 weeks and psychiatrically in 2-4 weeks





Total time spent for discharge is 32 minutes for patient education counseling 

and coordination of care and physical examination.


Brief Hospital Course


Ms. Hernandez  is a 41 old [sex] who presented with [ ]


Scheduled


Aspirin (Aspir 81), 1 TAB PO DAILY, (Reported)


Clonazepam (Clonazepam), 0.5 MG PO BID


Clonidine (Clonidine Tts-2), 1 PATCH TD WEEKLY, (Reported)


Fluoxetine Hcl (Fluoxetine Hcl), 60 MG PO DAILY, (Reported)


Lisinopril (Lisinopril), 1 TAB PO DAILY, (Reported)





Discontinued Medications


Clonidine Hcl (Clonidine Hcl), 0.2 MG TOP CHANGE EVERY MONDAY, (Reported)


Time Spent


Total time spent with patient [] minutes for coordination of care, counseling, 

and education.











CHARLETTE HASTINGS MD Jul 9, 2017 14:23

## 2017-07-09 NOTE — PDOC
PROGRESS NOTES


Assessment


Problems


Medical Problems:


(1) Right sided weakness


Status: Acute  





Investigation to evaluate for stroke with MRI of the brain was normal. There 

was evidence for previous hemorrhage in the left temporal region which was 

known. There was no new lesion. MRA of the intracranial arteries did identify 

the previously treated aneurysm without any leakage into the sac or new 

aneurysm. There was no area of stenosis. The right sided weakness would not 

appear to have an organic etiology.





2. Possible seizure


She is concerned about spells that awaken her from her sleep that she believes 

her seizure. She remembers the spells and her eyes rolled back in her head and 

she cannot open her jaw. This would be quite unusual for a seizure but not 

impossible. I will arrange for a sleep deprived EEG for the morning. We will 

hold the morning clonazepam as well. If the study is not revealing then she can 

be dismissed. She does have quite a few headaches and we could consider 

treating the headaches with either Depakote or topiramate.


Subjective


I am very scared about going home and having more spells at night.


Objective





Vital Signs








  Date Time  Temp Pulse Resp B/P (MAP) Pulse Ox O2 Delivery O2 Flow Rate FiO2


 


7/9/17 19:00 98.4 85 17 109/69 (82) 97 Room Air  





 98.4       














Intake and Output 


 


 7/9/17





 07:00


 


Intake Total 1660 ml


 


Balance 1660 ml


 


 


 


Intake Oral 660 ml


 


IV Total 1000 ml


 


# Voids 7








PHYSICAL EXAM


She was alert, awake and cooperative. Speech was fluent and clear. She did good 

fund of recent and remote knowledge. Attention and concentration was intact. 

The eyes were conjugate and face symmetric. Movements of the arms and legs were 

symmetric and well coordinated.


Review of Relevant


I have reviewed the following items mike (where applicable) has been applied.


Labs





Laboratory Tests








Test


  7/8/17


11:20 7/8/17


11:42 7/9/17


05:40


 


White Blood Count


  5.2 x10^3/uL


(4.0-11.0) 


  4.5 x10^3/uL


(4.0-11.0)


 


Red Blood Count


  3.88 x10^6/uL


(3.50-5.40) 


  3.47 x10^6/uL


(3.50-5.40)


 


Hemoglobin


  11.4 g/dL


(12.0-15.5) 


  10.1 g/dL


(12.0-15.5)


 


Hematocrit


  33.7 %


(36.0-47.0) 


  31.0 %


(36.0-47.0)


 


Mean Corpuscular Volume 87 fL ()   89 fL () 


 


Mean Corpuscular Hemoglobin 29 pg (25-35)   29 pg (25-35) 


 


Mean Corpuscular Hemoglobin


Concent 34 g/dL


(31-37) 


  33 g/dL


(31-37)


 


Red Cell Distribution Width


  14.0 %


(11.5-14.5) 


  14.4 %


(11.5-14.5)


 


Platelet Count


  467 x10^3/uL


(140-400) 


  361 x10^3/uL


(140-400)


 


Neutrophils (%) (Auto) 73 % (31-73)   52 % (31-73) 


 


Lymphocytes (%) (Auto) 20 % (24-48)   36 % (24-48) 


 


Monocytes (%) (Auto) 6 % (0-9)   8 % (0-9) 


 


Eosinophils (%) (Auto) 1 % (0-3)   3 % (0-3) 


 


Basophils (%) (Auto) 1 % (0-3)   1 % (0-3) 


 


Neutrophils # (Auto)


  3.8 x10^3uL


(1.8-7.7) 


  2.4 x10^3uL


(1.8-7.7)


 


Lymphocytes # (Auto)


  1.0 x10^3/uL


(1.0-4.8) 


  1.6 x10^3/uL


(1.0-4.8)


 


Monocytes # (Auto)


  0.3 x10^3/uL


(0.0-1.1) 


  0.4 x10^3/uL


(0.0-1.1)


 


Eosinophils # (Auto)


  0.0 x10^3/uL


(0.0-0.7) 


  0.1 x10^3/uL


(0.0-0.7)


 


Basophils # (Auto)


  0.0 x10^3/uL


(0.0-0.2) 


  0.0 x10^3/uL


(0.0-0.2)


 


Prothrombin Time


  12.8 SEC


(11.7-14.0) 


  


 


 


Prothromb Time International


Ratio 1.0 (0.8-1.1) 


  


  


 


 


Activated Partial


Thromboplast Time 29 SEC (24-38) 


  


  


 


 


Fibrinogen


  346 mg/dL


(200-440) 


  


 


 


Sodium Level


  142 mmol/L


(136-145) 


  139 mmol/L


(136-145)


 


Potassium Level


  3.6 mmol/L


(3.5-5.1) 


  3.6 mmol/L


(3.5-5.1)


 


Chloride Level


  106 mmol/L


() 


  104 mmol/L


()


 


Carbon Dioxide Level


  27 mmol/L


(21-32) 


  26 mmol/L


(21-32)


 


Anion Gap 9 (6-14)   9 (6-14) 


 


Blood Urea Nitrogen


  14 mg/dL


(7-20) 


  7 mg/dL (7-20) 


 


 


Creatinine


  0.9 mg/dL


(0.6-1.0) 


  0.7 mg/dL


(0.6-1.0)


 


Estimated GFR


(Cockcroft-Gault) 83.5 


  


  111.6 


 


 


Glucose Level


  133 mg/dL


(70-99) 


  89 mg/dL


(70-99)


 


Calcium Level


  8.7 mg/dL


(8.5-10.1) 


  8.5 mg/dL


(8.5-10.1)


 


Total Bilirubin


  0.2 mg/dL


(0.2-1.0) 


  


 


 


Direct Bilirubin


  0.1 mg/dL


(0.0-0.2) 


  


 


 


Aspartate Amino Transf


(AST/SGOT) 17 U/L (15-37) 


  


  


 


 


Alanine Aminotransferase


(ALT/SGPT) 22 U/L (14-59) 


  


  


 


 


Alkaline Phosphatase


  49 U/L


() 


  


 


 


Total Protein


  7.6 g/dL


(6.4-8.2) 


  


 


 


Albumin


  3.6 g/dL


(3.4-5.0) 


  


 


 


Glucose (Fingerstick)


  


  115 mg/dL


(70-99) 


 








Laboratory Tests








Test


  7/9/17


05:40


 


White Blood Count


  4.5 x10^3/uL


(4.0-11.0)


 


Red Blood Count


  3.47 x10^6/uL


(3.50-5.40)


 


Hemoglobin


  10.1 g/dL


(12.0-15.5)


 


Hematocrit


  31.0 %


(36.0-47.0)


 


Mean Corpuscular Volume 89 fL () 


 


Mean Corpuscular Hemoglobin 29 pg (25-35) 


 


Mean Corpuscular Hemoglobin


Concent 33 g/dL


(31-37)


 


Red Cell Distribution Width


  14.4 %


(11.5-14.5)


 


Platelet Count


  361 x10^3/uL


(140-400)


 


Neutrophils (%) (Auto) 52 % (31-73) 


 


Lymphocytes (%) (Auto) 36 % (24-48) 


 


Monocytes (%) (Auto) 8 % (0-9) 


 


Eosinophils (%) (Auto) 3 % (0-3) 


 


Basophils (%) (Auto) 1 % (0-3) 


 


Neutrophils # (Auto)


  2.4 x10^3uL


(1.8-7.7)


 


Lymphocytes # (Auto)


  1.6 x10^3/uL


(1.0-4.8)


 


Monocytes # (Auto)


  0.4 x10^3/uL


(0.0-1.1)


 


Eosinophils # (Auto)


  0.1 x10^3/uL


(0.0-0.7)


 


Basophils # (Auto)


  0.0 x10^3/uL


(0.0-0.2)


 


Sodium Level


  139 mmol/L


(136-145)


 


Potassium Level


  3.6 mmol/L


(3.5-5.1)


 


Chloride Level


  104 mmol/L


()


 


Carbon Dioxide Level


  26 mmol/L


(21-32)


 


Anion Gap 9 (6-14) 


 


Blood Urea Nitrogen 7 mg/dL (7-20) 


 


Creatinine


  0.7 mg/dL


(0.6-1.0)


 


Estimated GFR


(Cockcroft-Gault) 111.6 


 


 


Glucose Level


  89 mg/dL


(70-99)


 


Calcium Level


  8.5 mg/dL


(8.5-10.1)








Medications





Current Medications


Ondansetron HCl (Zofran) 4 mg 1X  ONCE IV  Last administered on 7/8/17at 11:57;

  Start 7/8/17 at 11:30;  Stop 7/8/17 at 11:31;  Status DC


Sodium Chloride 1,000 ml @  1,000 mls/hr 1X  ONCE IV  Last administered on 7/8/ 17at 11:57;  Start 7/8/17 at 11:30;  Stop 7/8/17 at 12:29;  Status DC


Ondansetron HCl (Zofran) 4 mg PRN Q8HRS  PRN IV NAUSEA/VOMITING;  Start 7/8/17 

at 12:45;  Stop 7/9/17 at 12:44;  Status DC


Aspirin (Amanda Aspirin) 325 mg 1X  ONCE PO  Last administered on 7/8/17at 13:03

;  Start 7/8/17 at 12:50;  Stop 7/8/17 at 12:51;  Status DC


Aspirin (Ecotrin) 81 mg DAILY PO  Last administered on 7/9/17at 08:56;  Start 7/ 9/17 at 09:00


Clonazepam (KlonoPIN) 0.5 mg BID PO  Last administered on 7/9/17at 08:56;  

Start 7/8/17 at 17:30


Fluoxetine HCl (PROzac) 60 mg DAILY PO  Last administered on 7/9/17at 08:56;  

Start 7/8/17 at 17:30


Lisinopril (Prinivil) 10 mg DAILY PO  Last administered on 7/9/17at 08:55;  

Start 7/8/17 at 17:30


Clonidine HCl (Catapres Tts-2) 1 patch WEEKLY TD ;  Start 7/15/17 at 09:00


Acetaminophen (Tylenol) 500 mg PRN Q6HRS  PRN PO MILD PAIN / TEMP Last 

administered on 7/9/17at 13:51;  Start 7/8/17 at 20:15


Clonazepam (KlonoPIN) 0.5 mg 1X  ONCE PO  Last administered on 7/9/17at 02:33;  

Start 7/9/17 at 02:30;  Stop 7/9/17 at 02:31;  Status DC


Lorazepam (Ativan) 1 mg 1X  ONCE IV  Last administered on 7/9/17at 10:28;  

Start 7/9/17 at 10:22;  Stop 7/9/17 at 10:23;  Status DC


Acetaminophen/ Hydrocodone Bitart (Lortab 5/325) 1 tab PRN Q4HRS  PRN PO PAIN 

Last administered on 7/9/17at 14:55;  Start 7/9/17 at 14:00





Active Scripts


Active


Clonazepam 0.5 Mg Tablet 0.5 Mg PO BID


Reported


Clonidine Tts-2 (Clonidine) 1 Each Patch.tdwk 1 Patch TD WEEKLY


Aspir 81 (Aspirin) 81 Mg Tablet. 1 Tab PO DAILY


Lisinopril 10 Mg Tablet 1 Tab PO DAILY


Fluoxetine Hcl 20 Mg Capsule 60 Mg PO DAILY


Vitals/I & O





Vital Sign - Last 24 Hours








 7/8/17 7/9/17 7/9/17 7/9/17





 23:00 03:00 07:28 08:00


 


Temp 97.9 98.6 98.4 





 97.9 98.6 98.4 


 


Pulse 79 90 82 


 


Resp 17 18 18 


 


B/P (MAP) 114/79 (91) 131/85 (100) 107/71 (83) 


 


Pulse Ox 99 99 100 


 


O2 Delivery Room Air Room Air Room Air Room Air


 


    





    





 7/9/17 7/9/17 7/9/17 7/9/17





 08:55 14:30 14:55 16:33


 


Temp  97.9  





  97.9  


 


Pulse 82 89  


 


Resp  16  


 


B/P (MAP) 107/71 121/85 (97)  


 


Pulse Ox  97 100 


 


O2 Delivery  Room Air Room Air Room Air


 


    





    





 7/9/17   





 19:00   


 


Temp 98.4   





 98.4   


 


Pulse 85   


 


Resp 17   


 


B/P (MAP) 109/69 (82)   


 


Pulse Ox 97   


 


O2 Delivery Room Air   














Intake and Output   


 


 7/8/17 7/8/17 7/9/17





 15:00 23:00 07:00


 


Intake Total 1000 ml  660 ml


 


Balance 1000 ml  660 ml

















SANDY MCGHEE MD Jul 9, 2017 20:43

## 2017-07-09 NOTE — RAD
MRI brain without contrast. Intracranial MR angiogram without contrast.

 

COMPARISON: CT head July 20 17.

 

TECHNIQUE: Axial 3-D time-of-flight and 3-D MIP reconstructions of the 

intracranial arteries acquired characterize vascular anatomy and 

pathology. Multiplanar MRI sequences of the brain were acquired without 

contrast.

 

HISTORY: Right-sided weakness, numbness and tingling, history of aneurysm 

coil embolization 10 years ago.

 

MRI brain findings: Susceptibility artifact on the diffusion sequence 

related to prior left craniotomy may decrease sensitivity to detect subtle

pathology along the surfaces of the frontal and parietal lobes. In light 

of this there is no acute infarct or diffusion weighted signal abnormality

of the brain evident. Focal encephalomalacia of the left lateral temporal 

lobe and underlying white matter T2 weighted hyperintensity consistent 

with chronic injury and ex vacuo dilation of the left atrium similar to 

prior CT imaging likely due to an old ischemic injury or sequela of an old

hemorrhage although no significant susceptibility artifact is present to 

suggest the presence of hemosiderin from old hemorrhage. The vertebral and

basilar arteries are hypoplastic likely due to fetal physiology. 3 mm 

pineal cyst. Asymmetric hypointensity at the left supraclinoid internal 

carotid artery likely related to the patient's embolized aneurysm. No 

intracranial hemorrhage, mass or hydrocephalus. Orbits, paranasal sinuses 

and mastoids are unremarkable.

 

IMPRESSION: No acute abnormality. Encephalomalacia of the left lateral 

temporal lobe likely due to an old ischemic injury or old hemorrhage. See 

discussion above.

 

 

 

Intracranial MRA angiogram findings: There is less flow related 

enhancement of the left anterior cavernous carotid artery related to a 

covered stent and susceptibility about the periphery of the supraclinoid 

left internal carotid related to coil embolization of the patient's 

aneurysm no flow related enhancement of the aneurysm sac evident. Fetal 

type bilateral posterior cerebral arteries. The vertebral and basilar 

arteries are hypoplastic. No arterial occlusion or stenosis.

 

IMPRESSION: Treatment of a left supraclinoid internal carotid artery 

aneurysm, no flow related enhancement of the embolized aneurysm sac. Fetal

physiology posterior cerebral arteries. No stenosis or occlusion.

 

Electronically signed by: Montez Lee MD (7/9/2017 11:53 AM) [facility]

## 2017-07-09 NOTE — RAD
MRI brain without contrast. Intracranial MR angiogram without contrast.

 

COMPARISON: CT head July 20 17.

 

TECHNIQUE: Axial 3-D time-of-flight and 3-D MIP reconstructions of the 

intracranial arteries acquired characterize vascular anatomy and 

pathology. Multiplanar MRI sequences of the brain were acquired without 

contrast.

 

HISTORY: Right-sided weakness, numbness and tingling, history of aneurysm 

coil embolization 10 years ago.

 

MRI brain findings: Susceptibility artifact on the diffusion sequence 

related to prior left craniotomy may decrease sensitivity to detect subtle

pathology along the surfaces of the frontal and parietal lobes. In light 

of this there is no acute infarct or diffusion weighted signal abnormality

of the brain evident. Focal encephalomalacia of the left lateral temporal 

lobe and underlying white matter T2 weighted hyperintensity consistent 

with chronic injury and ex vacuo dilation of the left atrium similar to 

prior CT imaging likely due to an old ischemic injury or sequela of an old

hemorrhage although no significant susceptibility artifact is present to 

suggest the presence of hemosiderin from old hemorrhage. The vertebral and

basilar arteries are hypoplastic likely due to fetal physiology. 3 mm 

pineal cyst. Asymmetric hypointensity at the left supraclinoid internal 

carotid artery likely related to the patient's embolized aneurysm. No 

intracranial hemorrhage, mass or hydrocephalus. Orbits, paranasal sinuses 

and mastoids are unremarkable.

 

IMPRESSION: No acute abnormality. Encephalomalacia of the left lateral 

temporal lobe likely due to an old ischemic injury or old hemorrhage. See 

discussion above.

 

 

 

Intracranial MRA angiogram findings: There is less flow related 

enhancement of the left anterior cavernous carotid artery related to a 

covered stent and susceptibility about the periphery of the supraclinoid 

left internal carotid related to coil embolization of the patient's 

aneurysm no flow related enhancement of the aneurysm sac evident. Fetal 

type bilateral posterior cerebral arteries. The vertebral and basilar 

arteries are hypoplastic. No arterial occlusion or stenosis.

 

IMPRESSION: Treatment of a left supraclinoid internal carotid artery 

aneurysm, no flow related enhancement of the embolized aneurysm sac. Fetal

physiology posterior cerebral arteries. No stenosis or occlusion.

 

Electronically signed by: Montez Lee MD (7/9/2017 11:53 AM) [facility]

## 2017-07-10 VITALS — SYSTOLIC BLOOD PRESSURE: 115 MMHG | DIASTOLIC BLOOD PRESSURE: 82 MMHG

## 2017-07-10 VITALS — DIASTOLIC BLOOD PRESSURE: 63 MMHG | SYSTOLIC BLOOD PRESSURE: 95 MMHG

## 2017-07-10 VITALS — DIASTOLIC BLOOD PRESSURE: 82 MMHG | SYSTOLIC BLOOD PRESSURE: 115 MMHG

## 2017-07-10 RX ADMIN — ASPIRIN SCH MG: 81 TABLET, COATED ORAL at 08:19

## 2017-07-10 RX ADMIN — LISINOPRIL SCH MG: 10 TABLET ORAL at 08:20

## 2017-07-10 NOTE — PDOC
PROGRESS NOTES


Assessment


Problems


Medical Problems:


(1) Right sided weakness


Status: Acute  





Possible migraine phenomonen


History of aneurysm


Possible seizures


Plan


Awake EEG


Will consider trial of empirical lamotrigine which should not have psychiatric 

effects but may also help with any psychiatric disease. I discussed the side 

effects in advance. Levetiracetam caused suicidal ideation.


Follow-up with Dr. Warren, who has seen the patient in the past.


Subjective


No complaints currently


Objective





Vital Signs








  Date Time  Temp Pulse Resp B/P (MAP) Pulse Ox O2 Delivery O2 Flow Rate FiO2


 


7/10/17 08:20  82  115/82    


 


7/10/17 08:00      Room Air  


 


7/10/17 07:42 98.1  16  100   





 98.1       














Intake and Output 


 


 7/10/17





 07:00


 


Intake Total 570 ml


 


Balance 570 ml


 


 


 


Intake Oral 570 ml


 


# Voids 7








PHYSICAL EXAM


Alert. Oriented to time, place and person.


PERRL.


EOMI.


CN: no focal findings.


Muscle tone: normal.


Muscle strength: 5/5


DTR: 2+


Plantar reflex:  flexor


Gait:  normal.


Sensory exam: no abnormal findings.


No cerebellar signs elicited.


Review of Relevant


I have reviewed the following items mike (where applicable) has been applied.


Labs





Laboratory Tests








Test


  7/8/17


11:20 7/8/17


11:42 7/9/17


05:40


 


White Blood Count


  5.2 x10^3/uL


(4.0-11.0) 


  4.5 x10^3/uL


(4.0-11.0)


 


Red Blood Count


  3.88 x10^6/uL


(3.50-5.40) 


  3.47 x10^6/uL


(3.50-5.40)


 


Hemoglobin


  11.4 g/dL


(12.0-15.5) 


  10.1 g/dL


(12.0-15.5)


 


Hematocrit


  33.7 %


(36.0-47.0) 


  31.0 %


(36.0-47.0)


 


Mean Corpuscular Volume 87 fL ()   89 fL () 


 


Mean Corpuscular Hemoglobin 29 pg (25-35)   29 pg (25-35) 


 


Mean Corpuscular Hemoglobin


Concent 34 g/dL


(31-37) 


  33 g/dL


(31-37)


 


Red Cell Distribution Width


  14.0 %


(11.5-14.5) 


  14.4 %


(11.5-14.5)


 


Platelet Count


  467 x10^3/uL


(140-400) 


  361 x10^3/uL


(140-400)


 


Neutrophils (%) (Auto) 73 % (31-73)   52 % (31-73) 


 


Lymphocytes (%) (Auto) 20 % (24-48)   36 % (24-48) 


 


Monocytes (%) (Auto) 6 % (0-9)   8 % (0-9) 


 


Eosinophils (%) (Auto) 1 % (0-3)   3 % (0-3) 


 


Basophils (%) (Auto) 1 % (0-3)   1 % (0-3) 


 


Neutrophils # (Auto)


  3.8 x10^3uL


(1.8-7.7) 


  2.4 x10^3uL


(1.8-7.7)


 


Lymphocytes # (Auto)


  1.0 x10^3/uL


(1.0-4.8) 


  1.6 x10^3/uL


(1.0-4.8)


 


Monocytes # (Auto)


  0.3 x10^3/uL


(0.0-1.1) 


  0.4 x10^3/uL


(0.0-1.1)


 


Eosinophils # (Auto)


  0.0 x10^3/uL


(0.0-0.7) 


  0.1 x10^3/uL


(0.0-0.7)


 


Basophils # (Auto)


  0.0 x10^3/uL


(0.0-0.2) 


  0.0 x10^3/uL


(0.0-0.2)


 


Prothrombin Time


  12.8 SEC


(11.7-14.0) 


  


 


 


Prothromb Time International


Ratio 1.0 (0.8-1.1) 


  


  


 


 


Activated Partial


Thromboplast Time 29 SEC (24-38) 


  


  


 


 


Fibrinogen


  346 mg/dL


(200-440) 


  


 


 


Sodium Level


  142 mmol/L


(136-145) 


  139 mmol/L


(136-145)


 


Potassium Level


  3.6 mmol/L


(3.5-5.1) 


  3.6 mmol/L


(3.5-5.1)


 


Chloride Level


  106 mmol/L


() 


  104 mmol/L


()


 


Carbon Dioxide Level


  27 mmol/L


(21-32) 


  26 mmol/L


(21-32)


 


Anion Gap 9 (6-14)   9 (6-14) 


 


Blood Urea Nitrogen


  14 mg/dL


(7-20) 


  7 mg/dL (7-20) 


 


 


Creatinine


  0.9 mg/dL


(0.6-1.0) 


  0.7 mg/dL


(0.6-1.0)


 


Estimated GFR


(Cockcroft-Gault) 83.5 


  


  111.6 


 


 


Glucose Level


  133 mg/dL


(70-99) 


  89 mg/dL


(70-99)


 


Calcium Level


  8.7 mg/dL


(8.5-10.1) 


  8.5 mg/dL


(8.5-10.1)


 


Total Bilirubin


  0.2 mg/dL


(0.2-1.0) 


  


 


 


Direct Bilirubin


  0.1 mg/dL


(0.0-0.2) 


  


 


 


Aspartate Amino Transf


(AST/SGOT) 17 U/L (15-37) 


  


  


 


 


Alanine Aminotransferase


(ALT/SGPT) 22 U/L (14-59) 


  


  


 


 


Alkaline Phosphatase


  49 U/L


() 


  


 


 


Total Protein


  7.6 g/dL


(6.4-8.2) 


  


 


 


Albumin


  3.6 g/dL


(3.4-5.0) 


  


 


 


Glucose (Fingerstick)


  


  115 mg/dL


(70-99) 


 








Medications





Current Medications


Ondansetron HCl (Zofran) 4 mg 1X  ONCE IV  Last administered on 7/8/17at 11:57;

  Start 7/8/17 at 11:30;  Stop 7/8/17 at 11:31;  Status DC


Sodium Chloride 1,000 ml @  1,000 mls/hr 1X  ONCE IV  Last administered on 7/8/ 17at 11:57;  Start 7/8/17 at 11:30;  Stop 7/8/17 at 12:29;  Status DC


Ondansetron HCl (Zofran) 4 mg PRN Q8HRS  PRN IV NAUSEA/VOMITING;  Start 7/8/17 

at 12:45;  Stop 7/9/17 at 12:44;  Status DC


Aspirin (Amanda Aspirin) 325 mg 1X  ONCE PO  Last administered on 7/8/17at 13:03

;  Start 7/8/17 at 12:50;  Stop 7/8/17 at 12:51;  Status DC


Aspirin (Ecotrin) 81 mg DAILY PO  Last administered on 7/10/17at 08:19;  Start 7 /9/17 at 09:00


Clonazepam (KlonoPIN) 0.5 mg BID PO  Last administered on 7/9/17at 20:37;  

Start 7/8/17 at 17:30


Fluoxetine HCl (PROzac) 60 mg DAILY PO  Last administered on 7/10/17at 08:20;  

Start 7/8/17 at 17:30


Lisinopril (Prinivil) 10 mg DAILY PO  Last administered on 7/10/17at 08:20;  

Start 7/8/17 at 17:30


Clonidine HCl (Catapres Tts-2) 1 patch WEEKLY TD ;  Start 7/15/17 at 09:00


Acetaminophen (Tylenol) 500 mg PRN Q6HRS  PRN PO MILD PAIN / TEMP Last 

administered on 7/9/17at 13:51;  Start 7/8/17 at 20:15


Clonazepam (KlonoPIN) 0.5 mg 1X  ONCE PO  Last administered on 7/9/17at 02:33;  

Start 7/9/17 at 02:30;  Stop 7/9/17 at 02:31;  Status DC


Lorazepam (Ativan) 1 mg 1X  ONCE IV  Last administered on 7/9/17at 10:28;  

Start 7/9/17 at 10:22;  Stop 7/9/17 at 10:23;  Status DC


Acetaminophen/ Hydrocodone Bitart (Lortab 5/325) 1 tab PRN Q4HRS  PRN PO PAIN 

Last administered on 7/9/17at 20:47;  Start 7/9/17 at 14:00





Active Scripts


Active


Clonazepam 0.5 Mg Tablet 0.5 Mg PO BID


Reported


Clonidine Tts-2 (Clonidine) 1 Each Patch.tdwk 1 Patch TD WEEKLY


Aspir 81 (Aspirin) 81 Mg Tablet. 1 Tab PO DAILY


Lisinopril 10 Mg Tablet 1 Tab PO DAILY


Fluoxetine Hcl 20 Mg Capsule 60 Mg PO DAILY


Vitals/I & O





Vital Sign - Last 24 Hours








 7/9/17 7/9/17 7/9/17 7/9/17





 14:30 14:55 19:00 20:14


 


Temp 97.9  98.4 





 97.9  98.4 


 


Pulse 89  85 


 


Resp 16  17 


 


B/P (MAP) 121/85 (97)  109/69 (82) 


 


Pulse Ox 97 100 97 


 


O2 Delivery Room Air Room Air Room Air Room Air


 


    





    





 7/9/17 7/9/17 7/9/17 7/10/17





 20:47 21:50 23:00 03:00


 


Temp   98.6 98.0





   98.6 98.0


 


Pulse   77 68


 


Resp 18 18 18 17


 


B/P (MAP)   112/69 (83) 95/63 (74)


 


Pulse Ox 97 97 98 100


 


O2 Delivery Room Air Room Air Room Air Room Air


 


    





    





 7/10/17 7/10/17 7/10/17 





 07:42 08:00 08:20 


 


Temp 98.1   





 98.1   


 


Pulse 82  82 


 


Resp 16   


 


B/P (MAP) 115/82 (93)  115/82 


 


Pulse Ox 100   


 


O2 Delivery Room Air Room Air  














Intake and Output   


 


 7/9/17 7/9/17 7/10/17





 15:00 23:00 07:00


 


Intake Total  120 ml 450 ml


 


Balance  120 ml 450 ml

















DONAL SCHERER MD Jul 10, 2017 10:16

## 2017-07-10 NOTE — [facility]
Admission Forms Criteria


                  GENERAL ADMISSION CRITERIA


 


                                                                               

   (Place 'X' for any and all applicable criteria):





Admission is indicated for ANY ONE of the following:





[ ]I.     Hemodynamic instability as indicated by ANY ONE of the following(1)(2)

(3)(4)(5):


          [ ]a) Vital sign abnormality not readily corrected by appropriate 

treatment within 12 to 24 hours indicated by ANY ONE of the following: 


                 [ ]i)    Hypotension


                 [ ]ii)   Symptomatic Tachycardia unresponsive to treatment (eg

, analgesia, fluids, sedation as indicated)


                 [ ]iii)  Orthostatic vital sign changes unresponsive to 

treatment (eg, fluids)


          [ ]b) Vital sign abnormality that is severe indicated by ANY ONE of 

the following:


                 [ ]i)    Inadequate perfusion indicated by ANY ONE of the 

following:


                          [ ]1)   Lactic acidosis (greater than 2 mmol/L)


                          [ ]2)   New abnormal capillary refill (greater than 3 

seconds)


                          [ ]3)   Other metabolic acidosis (arterial pH less 

than 7.35) not otherwise explained


                          [ ]4)   Reduced urine output


                          [ ]5)   Altered mental status


                          [ ]6)   Myocardial Ischemia


                 [ ]v)    Mean arterial pressure[A] less than 60 mm Hg


                 [ ]vi)   Mean arterial pressure[A] less than 70 mm Hg after 30 

minutes of appropriate treatment (eg, fluid resuscitation)


                 [ ]vii)  IV inotropic or vasopressor medication required to 

maintain adequate blood pressure or perfusion


                 [ ]viii) Sustained heart rate greater than 120 beats per 

minute in adult or child 6 years or older[B]]


[ ]II.    Hypertension requiring inpatient treatment as indicated by ANY ONE of 

the following(6)(7)(8):


                 [ ]a)  SBP greater than 220 mm Hg or DBP greater than 120 mm 

Hg despite treatment


                 [ ]b)  SBP greater than 140 mm Hg or DBP greater than 100 mm 

Hg with evidence of acute end organ 


                         damage as indicated by ANY ONE of the following:


                         [ ]i)    Encephalopathy


                         [ ]ii)   Acute renal failure as indicated by new onset 

of ANY ONE of the following(9)(10)(11)(12)(13):


                                  [ ]1)  A 3-fold rise in serum creatinine from 

baseline


                                  [ ]2) Serum creatinine greater than 4 mg/dL (

354 micromoles/L) with acute rise greater than 0.5 mg/dL (44.2 micromoles/L)


                                  [ ]3)  Reduction of more than 75% in 

estimated glomerular filtration rate from baseline


                                  [ ]4) Estimated glomerular filtration rate 

less than 35 mL/min/1.73m2 (0.59 mL/sec/1.73m2) in child up to 18 years of age


                                  [ ]5) Cessation of urine output indicated by 

ALL of the following: 


                                        [ ]A.   Adequate volume status


                                        [ ]B.   Inadequate urine output as 

indicated by ANY ONE of the following:


                                                [ ]a.     Urine output less 

than 0.3 mL/kg/hr for 24 hours


                                                [ ]b.     Anuria (urine output 

less than 0.1 mL/kg/hr) for 12 hours 


                        [ ]iii)  Aortic dissection


                        [ ]iv)  Myocardial ischemia


                        [ ]v)   Left ventricular heart failure 


                        [ ]vi)  Retinal hemorrhage


                        [ ]vii) Other significant finding 


                 [ ]c)  Hypertension in child requiring inpatient treatment as 

indicated by ALL of the following(14)(15)(16):


                        [ ]i)    Outpatient treatment not effective, not 

available, or not appropriate 


                        [ ]ii)   SBP or DBP greater than 95th percentile for age


                        [ ]iii)  Evidence of acute end organ damage as 

indicated by ANY ONE of the following:


                                 [ ]1)   Altered mental status


                                 [ ]2)   Acute renal failure as indicated by 

new onset of ANY ONE of the following(9)(10)(11)(12)(13):


                                        [ ]A.    A 3-fold rise in serum 

creatinine from baseline


                                        [ ]B.    Serum creatinine greater than 

4 mg/dL (354 micromoles/L) with acute rise greater than 0.5 mg/dL (44.2 

micromoles/L)


                                        [ ]C.    Reduction of more than 75% in 

estimated glomerular filtration rate from baseline


                                        [ ]D.    Estimated glomerular 

filtration rate less than 35 mL/min/1.73m2 (0.59 mL/sec/1.73m2)in child up to 

18 years of age


                                        [ ]E.    Cessation of urine output 

indicated by ALL of the following:


                                                  [ ]a.   Adequate volume status


                                                  [ ]b.   Inadequate urine 

output as indicated by ANY ONE of the following:


                                                           [ ]1)  Urine output 

less than 0.3 mL/kg/hr for 24 hours 


                                                           [ ]2)  Anuria (urine 

output less than 0.1 mL/kg/hr) for 12 hours


                                                           [ ]3)  Severe 

headache


                                                           [ ]4)  Visual 

disturbance


                                                           [ ]5)  Retinal 

hemorrhage


                                                           [ ]6)  Other 

significant finding


[ ]III.   Acute cardiac or peripheral ischemia as indicated by ANY ONE of the 

following:


          [ ]a)  Acute coronary syndrome(17)(18)


          [ ]b)  Acute peripheral ischemia (eg, pulseless, cool, mottled, or 

cyanotic extremity)(19) 


[ ]IV.     Cardiac arrhythmias or findings of immediate concern indicated by 

ANY ONE of the following(20)(21):


           [ ]a)  Heart rhythms that are inherently dangerous or unstable 

indicated by ANY ONE of the following(22)(23)(24):


                  [ ]i)    Resuscitated ventricular fibrillation or cardiac 

arrest 


                  [ ]ii)   Ventricular escape rhythm


                  [ ]iii)  Sustained ventricular tachycardia (30 seconds or 

more of ventricular rhythm at greater than 100 beats per minute)


                  [ ]iv)  Nonsustained ventricular tachycardia and ANY ONE of 

the following:


                          [ ]1)   Suspected cardiac ischemia as cause or 

consequence of ventricular tachycardia


                          [ ]2)   In setting of acute myocarditis


           [ ]b)  Unstable cardiac conduction defects indicated by ANY ONE of 

the following(24)(25)(26):


                  [ ]i)    Type II second-degree atrioventricular block 


                  [ ]ii)   Third-degree atrioventricular block


                  [ ]iii)  New-onset left bundle branch block with suspected 

myocardial ischemia


           [ ]c)  Any heart rhythm and ANY ONE of the following(22)(23)(27)(28)(

29):


                  [ ] i)    Continuous long-term ECG monitoring needed (eg, 

initiation of drug requiring monitoring for more than 24 hours)


                  [ ] ii)   Patient has automatic implanted cardioverter 

defibrillator that is repeatedly firing, malfunctioning, 


                            or in need of immediate adjustment of settings 

beyond the scope of ambulatory or observation care. 


           [ ]d)  Heart rhythms of concern due to ANY ONE of the following:


                  [ ]i)    Hypotension


                  [ ]ii)   Respiratory distress


                  [ ]iii)  Association with other significant symptoms (eg, 

bradycardia with syncope or ongoing dizziness, 


                          supraventricular tachycardia with chest pain) (27)(28)

(30)


[ ] V.          Severe heart failure as indicated by ANY ONE of the following (

31)(32):


            [ ]a)  Respiratory distress 


            [ ]b)  Hypotension


            [ ]c)  Anasarca (refractory to outpatient therapy) 


            [ ]d)  Cardiac arrhythmias of immediate concern 


            [ ]e)  Myocardial ischemia


[ ]VI.     Respiratory abnormalities, including ANY ONE of the following(33)(34)

(35)(36):


           [ ]a)  Respiratory rate greater than 30 breaths per minute 

unresponsive to treatment [A]


           [ ]b)  New saturation of arterial oxygen less than 90%


           [ ]c)  New partial pressure of carbon dioxide greater than 44 mm Hg (

5.9 kPa)


           [ ]d)  Supplemental oxygen or respiratory treatments needed that are 

new or not performable at other levels of care


           [ ]e)  New-onset cyanosis


           [ ]f)   Inability to protect airway


           [ ]g)  Chronic lung disease with severe deterioration (not 

responsive to emergency and observation care treatment as 


                   appropriate) as indicated by ANY ONE of the  following(34)(36

):


                     [ ]i)    SaO2 5% below baseline in patient with chronic 

hypoxemia


                     [ ]ii)   New requirement for supplemental oxygen to keep 

SaO2 at baseline or acceptable level


                     [ ]iii)  Required supplemental oxygen performable only in 

acute inpatient setting


                     [ ]iv)   Severe airflow or ventilation abnormalities


                     [ ]v)    Previously mobile patient unable to walk between 

rooms 


                     [ ]vi    Inability to eat or sleep due to dyspnea


                     [ ]vii)  Rapid rate of exacerbation onset 


                     [ ]viii) Altered mental status


 ]VII.  Severe airflow or ventilation abnormalities (not responsive to 

emergency and observation care treatment as appropriate) as 


         indicated by ANY  ONE of the following(33)(34)(35)(37):


          [ ]a)  PCO2 greater than 42 mm Hg (5.6 kPa) and pH less than 7.35 (new

)


          [ ]b)  Documented PCO2 increased more than 5 mm Hg (0.7 kPa) from 

disease baseline


          [ ]c)  Airflow measurements [B] less than 60% of previous best or 

predicted (eg, peak expiratory flow rate less than 300 L/minute)


                  despite intensive emergent treatment [C]


          [ ]d)  Required respiratory treatments that are performable only in 

acute inpatient setting


[ ]VIII.  Impending or actual respiratory arrest  ( Also use Respiratory 

Failure GRG  for severe respiratory disease and


          long-term mechanical ventilation patients)


[ ]IX.    Neurologic abnormalities, including ANY ONE of the following:


          [ ]a)  New findings that suggest ANY ONE of the following:


                 [ ]i)    CNS infection(38)


                 [ ]ii)   Cerebral bleeding, ischemia, or vasospasm(39)(40)


                 [ ]iii)  Increased intracranial pressure, hydrocephalus, or 

cerebral edema(41)(42)(43)


                 [ ]iv)  Spinal cord injury(44)


         [ ]b)  Uncontrolled seizures(45)


         [ ]c)  New-onset coma (eg, Clayton coma scale score less than 9) or 

unexplained abnormal mental status 


                (eg, Maico coma scale score less than 14) [D](41)(46)(47)


[ ]X.   New-onset severe neurologic findings requiring inpatient care; examples 

include(42)(48)(49):


         [ ]a)  Papilledema


         [ ]b)  Cerebral edema


         [ ]c)  Mass effect on CT scan


[ ]XI.    Suspected acute intra-abdominal process with peritoneal signs, 

abdominal mass, or similar findings (50)(51)(52)


[ ]XII.   Severe physiologic disorder remaining after emergency or observation 

level care (as appropriate) as indicated by 


         ANY ONE of the following (53):


         [ ]a)  Significant dehydration


         [ ]b)  Diabetic ketoacidosis


         [ ]c)  Hyperglycemic hyperosmolar state (eg, osmolality greater than 

320 mOsm/kg (mmol/kg)


         [ ]d)  Hypoglycemia


         [ ]e)  Other (new) acid-base disorder with pH less than 7.35 or 

greater than 7.5(54)


         [ ]f)  Thyroid storm (55)


         [ ]g)  Myxedema coma (55)


[ ]XIII.  Abdominal abnormalities with ANY ONE of the following(56)(57):


         [ ]a)  Absent bowel sounds with complete ileus


         [ ]b)  Signs of intestinal obstruction or peritonitis [E]


         [ ]c)  Nausea and vomiting that cannot be controlled with outpatient 

or observation care


[ ]XIV. Acute renal failure as indicated by new onset of ANY ONE of the 

following(9)(10)(11)(12)(13):


          [ ]a)  A 3-fold rise in serum creatinine from baseline


          [ ]b)  Serum creatinine greater than 4 mg/dL (354 micromoles/L) with 

acute rise greater than 0.5 mg/dL (44.2 micromoles/L)


          [ ]c)  Reduction of more than 75% in estimated glomerular filtration 

rate from baseline


          [ ]d)  Estimated glomerular filtration rate less than 35 mL/min/

1.73m2 (0.59 mL/sec/1.73m2) in child up to 18 years of age


          [ ]e)  Cessation of urine output indicated by ALL of the following:


                 [ ]i)    Adequate volume status


                 [ ]ii)   Inadequate urine output as indicated by ANY ONE of 

the following:


                         [ ]1)   Urine output less than 0.3 mL/kg/hr for 24 

hours


                         [ ]2)   Anuria (urine output less than 0.1 mL/kg/hr) 

for 12 hours


[ ]XV.  Significant uremic complications as indicated by ANY ONE of the 

following(58)(59)(60):


          [ ]a)  Outpatient therapy is ineffective or not feasible for ANY ONE 

of the following:


                 [ ]i)    Severe heart failure 


                 [ ]ii)   Severehypertension 


                 [ ]iii)  Pleural effusion


                 [ ]iv)  Pericarditis or pericardial effusion


           [ ]b)  Cardiac arrhythmias of immediate concern     


           [ ]c)  Intractable nausea or vomiting


           [ ]d)  Recurrent seizures 


           [ ]e)  Encephalopathy


           [ ]f)   Bleeding abnormalities (eg, platelet dysfunction) with 

active (eg, gastrointestinal) bleeding 


           [ ]g)  Dialysis indicated before long-term access or ambulatory 

arrangements can be made


           [ ]h)  Significant metabolic or electrolyte abnormalities (eg, 

severe acidosis or hyperkalemia)


[ ]XVI.  High fever or other high-risk infection situation as indicated by ANY 

ONE of the following(61)(62)(63)(64):


           [ ]a)  Outpatient and observation care antimicrobial treatment 

unavailable, not effective, or not appropriate 


           [ ]b)  Documented bacteremia


           [ ]c)  Temperature greater than 40.5 degrees C (104.9 degrees F) (

oral)


           [ ]d)  Temperature greater than 39.5 degrees C (103.1 degrees F) (

oral) or less than 36 degrees C (96.8 degrees F)


                   (rectal) that does not respond to e  treatment and 

observation care


[ ] XVII.  Temperature less than 95 degrees F (35 degrees C)(rectal)(65)


[ ] XVIII. Severe nutritional abnormalities as indicated by ALL of the following

(66)(67):


           [ ]a)  Inability to tolerate or establish sufficient oral or other 

enteral nutrition in outpatient setting 


           [ ]b)  Parenteral nutrition regimen need that must be implemented on 

inpatient basis


[ ] XIX.  Severe electrolyte abnormalities indicated by ALL of the following(68)

(69)(70):


           [ ]a)  Electrolytes and associated findings are not as expected for 

patient baseline or acceptable treatment effects.


           [ ]b)  Severe abnormalities indicated by ANY ONE of the following:


                  [ ]i)  Sodium less than 130 mEq/L (mmol/L) (new)


                  [ ]ii)Sodium less than 135 mEq/L (mmol/L) with ANY ONE of the 

following:


                        [ ]1)   Uncorrectable (to near normal or chronic 

baseline) after trial of outpatient and emergency treatment


                        [ ]2)   Altered mental status


                        [ ]3)   Seizures


                        [ ]4)   Severe medical etiology requiring inpatient 

management (eg, heart failure, hypovolemia)                 


                  [ ]iii)  Sodium greater than 155 mEq/L (mmol/L)


                  [ ]iv)  Sodium greater than 150 mEq/L (mmol/L) with ANY ONE 

of the following:


                        [ ]1)   Uncorrectable (to near normal or chronic 

baseline) with outpatient and emergency treatment


                        [ ]2)   Altered mental status


                        [ ]3)   Seizures


                        [ ]4)   Severe medical etiology (eg, hypovolemia, 

diabetes insipidus)


                  [ ]v)   Potassium less than 2.5 mEq/L (mmol/L) despite 

outpatient and emergency treatment 


                  [ ]vi)  Potassium less than 3 mEq/L (mmol/L) with ANY ONE of 

the following:


                         [ ]1)   Weakness


                         [ ]2)   Cardiac abnormality (eg, arrhythmia, 

conduction disturbance)


                         [ ]3)   Cardiac ischemia


                         [ ]4)   Ileus


                         [ ]5)   Ongoing medical cause requiring inpatient 

management (eg, acute renal wasting or SIADH)


                         [ ]6)   Other severe symptoms                 


                 [ ]vii) Potassium greater than 6.5 mEq/L (mmol/L)


                 [ ]viii) Potassium greater than 5 mEq/L (mmol/L) with ANY ONE 

of the following:


                        [ ]1)   Uncorrectable (to near normal or chronic 

baseline) with outpatient and emergency treatment


                        [ ]2)   Severe ECG findings [F]


                        [ ]3)   Acute worsening of renal failure (creatinine 

greater than 2.5 mg/dL (221 micromoles/L) or significant elevation for age and 

size)


                        [ ]4)   Severe weakness


                        [ ]5)   Severe medical etiology (eg, hemolysis, 

infection, drug overdose)


                 [ ]ix)  Calcium less than 7 mg/dL (1.75 mmol/L) despite 

outpatient and emergency treatment (72)


                 [ ]x)  Calcium less than 8 mg/dL (2 mmol/L) with significant 

symptoms or findings; examples include(72):                       


                         [ ]1)   Altered mental status


                         [ ]2)   Muscle spasms


                         [ ]3)   Seizures


                         [ ]4)   Breathing difficulty


                         [ ]5)   Cardiac abnormality (eg, arrhythmia or 

conduction disturbance)


                [ ]xi)  Calcium greater than 14 mg/dL (3.5 mmol/L)(72)


                [ ]xii) Calcium greater than 12 mg/dL (3 mmol/L) with ANY ONE 

of the following(72):


                         [ ]1)   Uncorrectable (to near normal or chronic 

baseline) with outpatient and emergency treatment


                         [ ]2)   Significant dehydration or hypovolemia as 

indicated by ALL of the following(70)(73)(74):


                                  [ ]A.  Not resolved with initial treatments


                                  [ ]B.  Clinically significant dehydration as 

indicated by ANY ONE of the following:


                                           [ ]a. Vomiting refractory to 

outpatient treatment (ie, precluding oral rehydration)


                                           [ ]b. Inability to drink


                                           [ ]c. Hypernatremia or other 

electrolyte abnormality unable to be corrected with outpatient and emergency 

treatment


                                           [ ]d. Failure to remain hydrated 

with outpatient therapy 


                                           [ ]e. Reduced urine output


                                           [ ]f. Hypotension


                                           [ ]g. Serious cause for dehydration 

requiring acute hospitalization (eg, bowel obstruction, increased 


                                                  intracranial pressure, 

infectious cause)


                                           [ ]h. Child with ANY ONE of the 

following(75):


                                                  [ ]1)  Severe abdominal 

tenderness


                                                  [ ]2)  Adequate care not 

available at home     


                                                  [ ]3)  Severe dehydration (

greater than 9% loss of body weight)


                                                  [ ]4)  Significant symptoms 

or findings; examples include: 


                                                          [ ]A. Altered mental 

status


                                                          [ ]B. Cardiac 

abnormality (eg, arrhythmia, conduction disturbance) 


                                                          [ ]C. Malignant 

etiology requiring inpatient treatment


                [ ]xiii)  Phosphorus less than 1 mg/dL (0.32 mmol/L)


                [ ]xiv)  Phosphorus less than 1.5 mg/dL (0.48 mmol/L) with ANY 

ONE of the following:


                          [ ]1)   Patient unresponsive to outpatient and 

emergency treatment


                          [ ]2)   Significant symptoms or findings; examples 

include: 


                                  [ ]A.  Weakness


                                  [ ]B. Altered mental status 


                                  [ ]C. Breathing difficulty 


                                  [ ]D. Seizures


                                  [ ]E. Rhabdomyolysis


                [ ]xv)   Phosphorus greater than 10 mg/dL (3.2 mmol/L)


                [ ]xvi)  Phosphorus greater than 4.5 mg/dL (1.45 mmol/L) (new) 

with ANY ONE of the following:


                          [ ]1)   Severe medical etiology (eg, crush injury, 

acute renal failure)


                          [ ]2)   Associated hypocalcemia with significant 

findings; examples include: 


                                  [ ]A.    Neurologic symptoms


                                  [ ]B.    Altered mental status


                                  [ ]C.    Muscle spasms


                                  [ ]D.    Seizures


                                  [ ]E.    Breathing difficulty


                                  [ ]F.    Cardiac abnormality (eg, arrhythmia, 

conduction disturbance)


                [ ]xvii)   Magnesium less than 1 mg/dL (0.41 mmol/L)


                [ ]xviii)  Magnesium less than 1.5 mg/dL (0.62 mmol/L) with ANY 

ONE of the following:


                           [ ]1)   Patient unresponsive to outpatient and 

emergency treatment


                           [ ]2)   Associated hypocalcemia with significant 

findings; examples include: 


                                   [ ]A.    Altered mental status


                                   [ ]B.    Muscle spasms


                                   [ ]C.    Seizures


                                   [ ]D.    Breathing difficulty


                                   [ ]E.    Cardiac abnormality (eg, arrhythmia

, conduction disturbance)


                           [ ]3)   Associated hypokalemia (potassium less than 

3 mEq/L (mmol/L)) with risk of arrhythmia 


                [ ]xix)  Magnesium greater than 4 mEq/L (2 mmol/L) 


                [ ]xx)   Magnesium greater than 2.5 mEq/L (1.25 mmol/L) with 

significant symptoms or findings; examples include:


                          [ ]1)   Weakness


                          [ ]2)   Altered mental status


                          [ ]3)   Cardiac abnormality (eg, arrhythmia, 

conduction disturbance)


                          [ ]4)   Breathing difficulty


                          [ ]5)   Severe medical etiology (eg, renal failure, 

hypovolemia)


               [ ]xxi)   Uric acid greater than 20 mg/dL (1190 micromoles/L)(76)


               [ ]xxii)  Uric acid greater than 8 mg/dL (476 micromoles/L) with 

significant symptoms or findings of tumor


                          lysis syndrome; examples include(76):


                          [ ]1)   Creatinine greater than 1.5 times upper limit 

of normal


                          [ ]2)   Cardiac abnormality (eg, arrhythmia, 

conduction disturbance)


                          [ ]3)   Seizure


[ ]XX.   Acute blood loss causing significant abnormality as indicated by ANY 

ONE of the following(77)(78):


         [ ]a)  Hemoglobin less than 10 g/dL (100 g/L) (not baseline)


         [ ]b)  Hematocrit less than 30% (0.30) (not baseline)


         [ ]c)  Repeat hematocrit decreased more than 2% (0.02)


         [ ]d)  Uncontrolled bleeding


[ ]XXI. Severe anemia indicated by ANY ONE of the following(78)(79):


         [ ]a)  Altered mental status 


         [ ]b)  Chest pain


         [ ]c)  Exertional dyspnea 


         [ ]d)  Syncope


         [ ]e)  Other findings suggesting inadequate perfusion


         [ ]f)   Treatment with transfusion or volume replacement is 

ineffective at resolving ANY ONE of the following [G]:


                 [ ]i)    Tachycardia for age


                 [ ]ii)   Orthostatic vital sign changes as indicated by ANY ONE

 of the following(80):


                         [ ]1)    Fall in SBP of 20 mm Hg or more 1 to 3 

minutes after patient sits or stands from recumbent position


                         [ ]2)    Fall in DBP of 10 mm Hg or more 1 to 3 

minutes after patient sits or stands from recumbent position


[ ]XXII. High-risk low platelet count as indicated by ANY ONE of the following(

81)(82):


          [ ]a)  Severe or life-threatening bleeding (eg, intracranial, major 

gastrointestinal, or extensive mucosal bleeding),


                  with any reduced platelet count


          [ ]b)  Platelet count less than 20,000/mm3 (20 x109/L) with any 

active bleeding


          [ ]c)  Platelet count less than 10,000/mm3 (10 x109/L) with minor 

purpura or petechiae 


          [ ]d)  Platelet count less than 5000/mm3 (5 x109/L)


          [ ]e)  Low platelet count with hemolytic anemia


[ ]XXIII.  Disseminated intravascular coagulation(77)(83)


[ ]XXIV. Severe adverse drug or systemic toxin reaction requiring inpatient 

treatment; examples include(84)(85):          


          [ ]a)  Serotonin syndrome(86)


          [ ]b)  Neuroleptic malignant syndrome(86)


          [ ]c)  Cholinergic syndrome with severe symptoms (eg, bronchorrhea, 

weakness, mental status changes, seizures)


          [ ]d)  Sympathetic syndrome with severe symptoms (eg, seizures, 

mental status changes, cardiac dysrhythmias)


          [ ]e)   Anticholinergic syndrome


[ ]XXV.    Severe pain requiring acute inpatient management as indicated by ALL 

of the following (87)(88)(89):        


          [ ]a)  Continuous or frequent (eg, every 2 to 4 hours) parenteral 

analgesics required [H]


          [ ]b)  Rapid improvement expected from treatment or acute 

intervention (eg, surgery, anesthesia procedure)


[ ]XXVI.Severe behavioral health issues judged unmanageable at a lower level of 

care (eg, residential) in a 


          patient who is ANY ONE of the following(91)          


          [ ]a)  Acutely suicidal


          [ ]b)  A danger to self (eg, self-mutilating or suicidal behavior)


          [ ]c)  A danger to others (eg, assaultive or homicidal behavior)


          [ ]d)   Incapacitated because of grave disability (eg, inability to 

provide for self at lower level of care) (92) 


[X]XXVII. Inpatient monitoring needed; examples include(1)(3)(87)(93)(94)(95)(96

):


          [X]a)  Vital signs, neurologic signs, or vascular checks more 

frequently than every 4 hours


          [ ]b)  Cardiac or respiratory monitoring beyond the scope (eg, over 

24 hours) of observation care


          [ ]c)  Pulmonary artery catheter monitoring


          [ ]d)  Suspected compartment syndrome(97) (98)


          [ ]e)  Cerebral bleeding, hydrocephalus, or vasospasm monitoring


          [ ]f)   Increased intracranial pressure or cerebral edema monitoring


          [ ]g)  Fetal monitoring


[ ]XXVIII. Treatment requiring inpatient care; examples include:


          [ ]a)  IV fluid to replace significant ongoing losses (greater than 3 

L/m2 per day)(53)


          [ ]b)  High concentration oxygen (greater than 40%)(33)(99)(100)


          [ ]c)  Frequent respiratory therapy (more frequently than every 4 

hours) to maintain airflow rates greater 


                  than 60% of baseline(33)(99)(100)


          [ ]d)  Epidural analgesia(87)


          [ ]e)  IV anticoagulation, vasoactive, or antiarrhythmic medication(19

)(23)


          [ ]f)   Acute thrombolytics (generally require 24 hours of observation

)(101)(102)


[ ]XXIX.  Emergency procedures needed; examples include:


          [ ]a)  Emergency inpatient surgery


          [ ]b)  Temporary pacemaker placement(103)


          [ ]c)  Chest tube placement with active evacuation (eg, suction, 

drainage)(104)


          [ ]d)   Emergent cardioversion(105)


          [ ]e)  Emergent cardiac or vascular procedures (eg, cardiac 

catheterization, angioplasty) (17)(18)


          [ ]f)   Emergent dialysis access placement and institution(10)(106)


          [ ]g)  Emergent pericardiocentesis(107)


          [ ]h)  Emergent plasmapheresis or leukapheresis(83)


          [ ]i)   Emergent tracheostomy








The original [employer] content created by [employer] has been revised. 


The portions of the content which have been revised are identified through the 

use of italic text or in bold, and [email or web address] 


has neither reviewed nor approved the modified material. All other unmodified 

content is copyright  [employer].





Please see references footnoted in the original [email or web address] edition 

2016


Admission Criteria Met?:  Yes











DARRIAN HOGUE Jul 10, 2017 03:00

## 2017-07-10 NOTE — PDOC
DATE


DATE OF STUDY


7/10/17


EEG # [id number]





EEG STUDY RESULTS


EEG STUDY RESULTS


OBJECT: The patient is a 41-year-old female with episodes of altered 

consciousness, rule out seizures





DESCRIPTION: This is a digital study. Electrodes are placed according to the 

international 10-20 system. Bipolar and referential montages are available. 

Activation procedures typically included hyperventilation and intermittent 

photic stimulation.





INTERPRETATION: The waking background consists of 9-10 Hz,  V activity 

reactive to eye opening. There is attenuation of the background over the right 

hemisphere (or the higher amplitude in the left hemisphere may represent a 

breach activity). During the recording the patient had an anxiety episode not 

accompanied by abnormal electroencephalogram activity. Sleep was not achieved. 

Hyperventilation is noncontributory. Intermittent photic stimulations are 

noncontributory.





IMPRESSION: This select encephalogram the patient awake only is abnormal 

because of asymmetry of the amplitude of the background activity consistent 

with the patient's known craniotomy history. An episode of anxiety was recorded

, unaccompanied by abnormal activity. There is no focal, paroxysmal, or 

epileptiform activity.





Thank you for letting me help with the patient's care.











DONAL SCHERER MD Jul 10, 2017 14:23

## 2017-07-10 NOTE — PDOC
PROGRESS NOTES


Chief Complaint


Chief Complaint


Chief complaint seizure-like activity





Date of exam 7/9/2017 this is a late entry. Next





Assessment and plan


Possible migraine phenomonen


History of aneurysm


Possible seizures





Plan





Allergies conserving her Topamax treatment for a possible migraine phenomenon, 

imaging studies revealed no acute findings noted. Labs reviewed. Continue 

current management, discharge planning based on neurology input.





Vitals


Vitals





Vital Signs








  Date Time  Temp Pulse Resp B/P (MAP) Pulse Ox O2 Delivery O2 Flow Rate FiO2


 


7/10/17 08:20  82  115/82    


 


7/10/17 08:00      Room Air  


 


7/10/17 07:42 98.1  16  100   





 98.1       











Physical Exam


General:  Alert, Oriented X3


Heart:  Normal S1, Normal S2


Lungs:  Clear


Abdomen:  Normal bowel sounds


Extremities:  No clubbing, No cyanosis





Assessment and Plan


Assessmemt and Plan


Problems


Medical Problems:


(1) Right sided weakness


Status: Acute  








Problems:  





Comment


Review of Relevant


I have reviewed the following items mike (where applicable) has been applied.


Labs





Laboratory Tests








Test


  7/8/17


11:20 7/8/17


11:42 7/9/17


05:40


 


White Blood Count


  5.2 x10^3/uL


(4.0-11.0) 


  4.5 x10^3/uL


(4.0-11.0)


 


Red Blood Count


  3.88 x10^6/uL


(3.50-5.40) 


  3.47 x10^6/uL


(3.50-5.40)


 


Hemoglobin


  11.4 g/dL


(12.0-15.5) 


  10.1 g/dL


(12.0-15.5)


 


Hematocrit


  33.7 %


(36.0-47.0) 


  31.0 %


(36.0-47.0)


 


Mean Corpuscular Volume 87 fL ()   89 fL () 


 


Mean Corpuscular Hemoglobin 29 pg (25-35)   29 pg (25-35) 


 


Mean Corpuscular Hemoglobin


Concent 34 g/dL


(31-37) 


  33 g/dL


(31-37)


 


Red Cell Distribution Width


  14.0 %


(11.5-14.5) 


  14.4 %


(11.5-14.5)


 


Platelet Count


  467 x10^3/uL


(140-400) 


  361 x10^3/uL


(140-400)


 


Neutrophils (%) (Auto) 73 % (31-73)   52 % (31-73) 


 


Lymphocytes (%) (Auto) 20 % (24-48)   36 % (24-48) 


 


Monocytes (%) (Auto) 6 % (0-9)   8 % (0-9) 


 


Eosinophils (%) (Auto) 1 % (0-3)   3 % (0-3) 


 


Basophils (%) (Auto) 1 % (0-3)   1 % (0-3) 


 


Neutrophils # (Auto)


  3.8 x10^3uL


(1.8-7.7) 


  2.4 x10^3uL


(1.8-7.7)


 


Lymphocytes # (Auto)


  1.0 x10^3/uL


(1.0-4.8) 


  1.6 x10^3/uL


(1.0-4.8)


 


Monocytes # (Auto)


  0.3 x10^3/uL


(0.0-1.1) 


  0.4 x10^3/uL


(0.0-1.1)


 


Eosinophils # (Auto)


  0.0 x10^3/uL


(0.0-0.7) 


  0.1 x10^3/uL


(0.0-0.7)


 


Basophils # (Auto)


  0.0 x10^3/uL


(0.0-0.2) 


  0.0 x10^3/uL


(0.0-0.2)


 


Prothrombin Time


  12.8 SEC


(11.7-14.0) 


  


 


 


Prothromb Time International


Ratio 1.0 (0.8-1.1) 


  


  


 


 


Activated Partial


Thromboplast Time 29 SEC (24-38) 


  


  


 


 


Fibrinogen


  346 mg/dL


(200-440) 


  


 


 


Sodium Level


  142 mmol/L


(136-145) 


  139 mmol/L


(136-145)


 


Potassium Level


  3.6 mmol/L


(3.5-5.1) 


  3.6 mmol/L


(3.5-5.1)


 


Chloride Level


  106 mmol/L


() 


  104 mmol/L


()


 


Carbon Dioxide Level


  27 mmol/L


(21-32) 


  26 mmol/L


(21-32)


 


Anion Gap 9 (6-14)   9 (6-14) 


 


Blood Urea Nitrogen


  14 mg/dL


(7-20) 


  7 mg/dL (7-20) 


 


 


Creatinine


  0.9 mg/dL


(0.6-1.0) 


  0.7 mg/dL


(0.6-1.0)


 


Estimated GFR


(Cockcroft-Gault) 83.5 


  


  111.6 


 


 


Glucose Level


  133 mg/dL


(70-99) 


  89 mg/dL


(70-99)


 


Calcium Level


  8.7 mg/dL


(8.5-10.1) 


  8.5 mg/dL


(8.5-10.1)


 


Total Bilirubin


  0.2 mg/dL


(0.2-1.0) 


  


 


 


Direct Bilirubin


  0.1 mg/dL


(0.0-0.2) 


  


 


 


Aspartate Amino Transf


(AST/SGOT) 17 U/L (15-37) 


  


  


 


 


Alanine Aminotransferase


(ALT/SGPT) 22 U/L (14-59) 


  


  


 


 


Alkaline Phosphatase


  49 U/L


() 


  


 


 


Total Protein


  7.6 g/dL


(6.4-8.2) 


  


 


 


Albumin


  3.6 g/dL


(3.4-5.0) 


  


 


 


Glucose (Fingerstick)


  


  115 mg/dL


(70-99) 


 








Medications





Current Medications


Ondansetron HCl (Zofran) 4 mg 1X  ONCE IV  Last administered on 7/8/17at 11:57;

  Start 7/8/17 at 11:30;  Stop 7/8/17 at 11:31;  Status DC


Sodium Chloride 1,000 ml @  1,000 mls/hr 1X  ONCE IV  Last administered on 7/8/ 17at 11:57;  Start 7/8/17 at 11:30;  Stop 7/8/17 at 12:29;  Status DC


Ondansetron HCl (Zofran) 4 mg PRN Q8HRS  PRN IV NAUSEA/VOMITING;  Start 7/8/17 

at 12:45;  Stop 7/9/17 at 12:44;  Status DC


Aspirin (Amanda Aspirin) 325 mg 1X  ONCE PO  Last administered on 7/8/17at 13:03

;  Start 7/8/17 at 12:50;  Stop 7/8/17 at 12:51;  Status DC


Aspirin (Ecotrin) 81 mg DAILY PO  Last administered on 7/10/17at 08:19;  Start 7 /9/17 at 09:00


Clonazepam (KlonoPIN) 0.5 mg BID PO  Last administered on 7/9/17at 20:37;  

Start 7/8/17 at 17:30


Fluoxetine HCl (PROzac) 60 mg DAILY PO  Last administered on 7/10/17at 08:20;  

Start 7/8/17 at 17:30


Lisinopril (Prinivil) 10 mg DAILY PO  Last administered on 7/10/17at 08:20;  

Start 7/8/17 at 17:30


Clonidine HCl (Catapres Tts-2) 1 patch WEEKLY TD ;  Start 7/15/17 at 09:00


Acetaminophen (Tylenol) 500 mg PRN Q6HRS  PRN PO MILD PAIN / TEMP Last 

administered on 7/9/17at 13:51;  Start 7/8/17 at 20:15


Clonazepam (KlonoPIN) 0.5 mg 1X  ONCE PO  Last administered on 7/9/17at 02:33;  

Start 7/9/17 at 02:30;  Stop 7/9/17 at 02:31;  Status DC


Lorazepam (Ativan) 1 mg 1X  ONCE IV  Last administered on 7/9/17at 10:28;  

Start 7/9/17 at 10:22;  Stop 7/9/17 at 10:23;  Status DC


Acetaminophen/ Hydrocodone Bitart (Lortab 5/325) 1 tab PRN Q4HRS  PRN PO PAIN 

Last administered on 7/9/17at 20:47;  Start 7/9/17 at 14:00





Active Scripts


Active


Clonazepam 0.5 Mg Tablet 0.5 Mg PO BID


Reported


Clonidine Tts-2 (Clonidine) 1 Each Patch.tdwk 1 Patch TD WEEKLY


Aspir 81 (Aspirin) 81 Mg Tablet.dr 1 Tab PO DAILY


Lisinopril 10 Mg Tablet 1 Tab PO DAILY


Fluoxetine Hcl 20 Mg Capsule 60 Mg PO DAILY


Vitals/I & O





Vital Sign - Last 24 Hours








 7/9/17 7/9/17 7/9/17 7/9/17





 14:30 14:55 19:00 20:14


 


Temp 97.9  98.4 





 97.9  98.4 


 


Pulse 89  85 


 


Resp 16  17 


 


B/P (MAP) 121/85 (97)  109/69 (82) 


 


Pulse Ox 97 100 97 


 


O2 Delivery Room Air Room Air Room Air Room Air


 


    





    





 7/9/17 7/9/17 7/9/17 7/10/17





 20:47 21:50 23:00 03:00


 


Temp   98.6 98.0





   98.6 98.0


 


Pulse   77 68


 


Resp 18 18 18 17


 


B/P (MAP)   112/69 (83) 95/63 (74)


 


Pulse Ox 97 97 98 100


 


O2 Delivery Room Air Room Air Room Air Room Air


 


    





    





 7/10/17 7/10/17 7/10/17 





 07:42 08:00 08:20 


 


Temp 98.1   





 98.1   


 


Pulse 82  82 


 


Resp 16   


 


B/P (MAP) 115/82 (93)  115/82 


 


Pulse Ox 100   


 


O2 Delivery Room Air Room Air  














Intake and Output   


 


 7/9/17 7/9/17 7/10/17





 15:00 23:00 07:00


 


Intake Total  120 ml 450 ml


 


Balance  120 ml 450 ml

















CHARLETTE HASTINGS MD Jul 10, 2017 10:30

## 2019-08-20 ENCOUNTER — HOSPITAL ENCOUNTER (EMERGENCY)
Dept: HOSPITAL 61 - ER | Age: 44
Discharge: HOME | End: 2019-08-20
Payer: MEDICAID

## 2019-08-20 VITALS — WEIGHT: 153.31 LBS | HEIGHT: 62 IN | BODY MASS INDEX: 28.21 KG/M2

## 2019-08-20 VITALS — DIASTOLIC BLOOD PRESSURE: 62 MMHG | SYSTOLIC BLOOD PRESSURE: 99 MMHG

## 2019-08-20 DIAGNOSIS — Z88.8: ICD-10-CM

## 2019-08-20 DIAGNOSIS — N92.1: ICD-10-CM

## 2019-08-20 DIAGNOSIS — Z86.73: ICD-10-CM

## 2019-08-20 DIAGNOSIS — E78.00: ICD-10-CM

## 2019-08-20 DIAGNOSIS — Z88.5: ICD-10-CM

## 2019-08-20 DIAGNOSIS — R51: ICD-10-CM

## 2019-08-20 DIAGNOSIS — Z98.51: ICD-10-CM

## 2019-08-20 DIAGNOSIS — D64.9: Primary | ICD-10-CM

## 2019-08-20 DIAGNOSIS — I10: ICD-10-CM

## 2019-08-20 LAB
ALBUMIN SERPL-MCNC: 3.2 G/DL (ref 3.4–5)
ALBUMIN/GLOB SERPL: 0.8 {RATIO} (ref 1–1.7)
ALP SERPL-CCNC: 51 U/L (ref 46–116)
ALT SERPL-CCNC: 27 U/L (ref 14–59)
AMPHETAMINE/METHAMPHETAMINE: (no result)
ANION GAP SERPL CALC-SCNC: 11 MMOL/L (ref 6–14)
ANISOCYTOSIS BLD QL SMEAR: SLIGHT
AST SERPL-CCNC: 18 U/L (ref 15–37)
BARBITURATES UR-MCNC: (no result) UG/ML
BASOPHILS # BLD AUTO: 0 X10^3/UL (ref 0–0.2)
BASOPHILS NFR BLD: 0 % (ref 0–3)
BENZODIAZ UR-MCNC: (no result) UG/L
BILIRUB SERPL-MCNC: 0.2 MG/DL (ref 0.2–1)
BUN SERPL-MCNC: 12 MG/DL (ref 7–20)
BUN/CREAT SERPL: 15 (ref 6–20)
CALCIUM SERPL-MCNC: 8.5 MG/DL (ref 8.5–10.1)
CANNABINOIDS UR-MCNC: (no result) UG/L
CHLORIDE SERPL-SCNC: 104 MMOL/L (ref 98–107)
CK SERPL-CCNC: 72 U/L (ref 26–192)
CO2 SERPL-SCNC: 24 MMOL/L (ref 21–32)
COCAINE UR-MCNC: (no result) NG/ML
CREAT SERPL-MCNC: 0.8 MG/DL (ref 0.6–1)
EOSINOPHIL NFR BLD: 0.2 X10^3/UL (ref 0–0.7)
EOSINOPHIL NFR BLD: 3 % (ref 0–3)
ERYTHROCYTE [DISTWIDTH] IN BLOOD BY AUTOMATED COUNT: 17.7 % (ref 11.5–14.5)
GFR SERPLBLD BASED ON 1.73 SQ M-ARVRAT: 94.3 ML/MIN
GLOBULIN SER-MCNC: 3.9 G/DL (ref 2.2–3.8)
GLUCOSE SERPL-MCNC: 94 MG/DL (ref 70–99)
HCT VFR BLD CALC: 23.8 % (ref 36–47)
HGB BLD-MCNC: 7.5 G/DL (ref 12–15.5)
LYMPHOCYTES # BLD: 1.6 X10^3/UL (ref 1–4.8)
LYMPHOCYTES NFR BLD AUTO: 32 % (ref 24–48)
MAGNESIUM SERPL-MCNC: 1.7 MG/DL (ref 1.8–2.4)
MCH RBC QN AUTO: 21 PG (ref 25–35)
MCHC RBC AUTO-ENTMCNC: 31 G/DL (ref 31–37)
MCV RBC AUTO: 65 FL (ref 79–100)
METHADONE SERPL-MCNC: (no result) NG/ML
MONO #: 0.3 X10^3/UL (ref 0–1.1)
MONOCYTES NFR BLD: 7 % (ref 0–9)
NEUT #: 2.8 X10^3/UL (ref 1.8–7.7)
NEUTROPHILS NFR BLD AUTO: 57 % (ref 31–73)
OPIATES UR-MCNC: (no result) NG/ML
PCP SERPL-MCNC: (no result) MG/DL
PLATELET # BLD AUTO: 502 X10^3/UL (ref 140–400)
PLATELET # BLD EST: (no result) 10*3/UL
POLYCHROMASIA BLD QL SMEAR: SLIGHT
POTASSIUM SERPL-SCNC: 3.6 MMOL/L (ref 3.5–5.1)
PROT SERPL-MCNC: 7.1 G/DL (ref 6.4–8.2)
RBC # BLD AUTO: 3.64 X10^6/UL (ref 3.5–5.4)
SODIUM SERPL-SCNC: 139 MMOL/L (ref 136–145)
WBC # BLD AUTO: 4.9 X10^3/UL (ref 4–11)

## 2019-08-20 PROCEDURE — 85025 COMPLETE CBC W/AUTO DIFF WBC: CPT

## 2019-08-20 PROCEDURE — 82550 ASSAY OF CK (CPK): CPT

## 2019-08-20 PROCEDURE — 84484 ASSAY OF TROPONIN QUANT: CPT

## 2019-08-20 PROCEDURE — 36415 COLL VENOUS BLD VENIPUNCTURE: CPT

## 2019-08-20 PROCEDURE — 96374 THER/PROPH/DIAG INJ IV PUSH: CPT

## 2019-08-20 PROCEDURE — 81025 URINE PREGNANCY TEST: CPT

## 2019-08-20 PROCEDURE — 83735 ASSAY OF MAGNESIUM: CPT

## 2019-08-20 PROCEDURE — 80053 COMPREHEN METABOLIC PANEL: CPT

## 2019-08-20 PROCEDURE — 93005 ELECTROCARDIOGRAM TRACING: CPT

## 2019-08-20 PROCEDURE — 70450 CT HEAD/BRAIN W/O DYE: CPT

## 2019-08-20 PROCEDURE — 99285 EMERGENCY DEPT VISIT HI MDM: CPT

## 2019-08-20 PROCEDURE — 71046 X-RAY EXAM CHEST 2 VIEWS: CPT

## 2019-08-20 PROCEDURE — 80307 DRUG TEST PRSMV CHEM ANLYZR: CPT

## 2019-08-20 PROCEDURE — 83880 ASSAY OF NATRIURETIC PEPTIDE: CPT

## 2019-08-20 NOTE — RAD
CT Head W/O Contrast:

 

History: Blurred vision

 

Comparison: July 8, 2017

 

Axial images were obtained without contrast.

 

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 There is also beam Foss 

artifact from prior coiling of the [place].

 

The remaining gray and white matter appears normal and symmetrical for the

patients age.  There is no mass effect, extraaxial fluid collections or 

hydrocephalus.  There is no gross bleed.  There is no focal loss of 

gray-white matter distinction to suggest acute ischemia, i.e. stroke.

 

Impression:  No acute findings.

 

PQRS Compliance Statement:

 

One or more of the following individualized dose reduction techniques were

utilized for this examination:  

1. Automated exposure control  

2. Adjustment of the mA and/or kV according to patient size  

3. Use of iterative reconstruction technique

 

Electronically signed by: Valdemar Matt III, MD (8/20/2019 11:08 AM) 

[facility]

## 2019-08-20 NOTE — RAD
EXAM: Chest, 2 views.

 

HISTORY: Edema.

 

COMPARISON: 11/14/2014.

 

FINDINGS: 2 views of the chest are obtained. There is no infiltrate, 

pleural effusion or pneumothorax. The heart is normal in size.

 

IMPRESSION: No acute pulmonary finding.

 

Electronically signed by: Melissa Mcgraw MD (8/20/2019 11:17 AM) [facility]

## 2019-08-20 NOTE — PHYS DOC
Past Medical History


Past Medical History:  Anxiety, CVA, High Cholesterol, Hypertension, Seizure


Additional Past Medical Histor:  blood clot in brain with pregnancy


Past Surgical History:  Tubal ligation, Other


Additional Past Surgical Histo:  brain surgery - aneurysm with coil


Alcohol Use:  None


Drug Use:  None





Adult General


Chief Complaint


Chief Complaint:  HEADACHE





HPI


HPI





Patient is a 44  year old female who presents with multiple complaints including

headache, left blurred vision, facial swelling, hands and leg edema, not feeling

good. Patient complaining of bilateral facial edema for almost one week without 

erythema or rash or pain and complaining of left eye intermittent blurred vision

since yesterday with left-sided headache as a constant aching and throbbing pain

and rated her pain 7/10. Patient complaining of swelling of hands and legs that 

improved. Patient complaining of generalized weakness and tiredness. Patient 

denies chest pain, fever and chills, pregnancy, focal neuro deficit.





Review of Systems


Review of Systems





Constitutional: Denies fever or chills []


Eyes: Denies  redness, or eye pain, reports blurred vision.


HENT: Denies nasal congestion or sore throat []


Respiratory: Denies cough or shortness of breath []


Cardiovascular: No additional information not addressed in HPI []


GI: Denies abdominal pain, nausea, vomiting, bloody stools or diarrhea []


: Denies dysuria or hematuria []


Musculoskeletal: Denies back pain or joint pain []


Integument: Denies rash or skin lesions []


Neurologic: Reports headache, focal weakness or sensory changes []


Endocrine: Denies polyuria or polydipsia []





All other systems were reviewed and found to be within normal limits, except as 

documented in this note.





Current Medications


Current Medications





Current Medications








 Medications


  (Trade)  Dose


 Ordered  Sig/[facility]  Start Time


 Stop Time Status Last Admin


Dose Admin


 


 Fentanyl Citrate


  (Fentanyl 2ml


 Vial)  50 mcg  1X  ONCE  19 11:00


 19 11:01 DC 19 11:12


50 MCG











Allergies


Allergies





Allergies








Coded Allergies Type Severity Reaction Last Updated Verified


 


  metoclopramide Allergy Severe Anaphylaxis 10/29/14 No


 


  morphine Allergy Intermediate itching 10/29/14 Yes











Physical Exam


Physical Exam








Constitutional: Well developed, well nourished, mild distress, non-toxic 

appearance, mild pallor. []


HENT: Normocephalic, atraumatic, no obvious facial edema.


Eyes: PERRLA, EOMI, conjunctiva normal, no discharge. [] 


Neck: Normal range of motion, no tenderness, supple, no stridor. [] 


Cardiovascular:Heart rate regular rhythm, no murmur []


Lungs & Thorax:  Bilateral breath sounds clear to auscultation []


Abdomen: Bowel sounds normal, soft, no tenderness, no masses, no pulsatile 

masses. [] 


Skin: Warm, dry, no erythema, no rash. [] 


Back: No tenderness, no CVA tenderness. [] 


Extremities: No tenderness, no cyanosis, no clubbing, ROM intact, no upper and 

lower extremity edema. [] 


Neurologic: Alert and oriented X 3, no focal deficits noted. []


Psychologic: Affect normal, judgement normal, mood normal. []





Current Patient Data


Vital Signs





                                   Vital Signs








  Date Time  Temp Pulse Resp B/P (MAP) Pulse Ox O2 Delivery O2 Flow Rate FiO2


 


19 12:18     95   


 


19 10:28 99.4 99 18 137/74 (95)  Room Air  





 99.4       








Lab Values





                                Laboratory Tests








Test


 19


10:24 19


10:25 19


10:45


 


Urine Opiates Screen Neg (NEG)    


 


Urine Methadone Screen Neg (NEG)    


 


Urine Barbiturates Neg (NEG)    


 


Urine Phencyclidine Screen Neg (NEG)    


 


Urine


Amphetamine/Methamphetamine Neg (NEG)  


 


 





 


Urine Benzodiazepines Screen Neg (NEG)    


 


Urine Cocaine Screen Neg (NEG)    


 


Urine Cannabinoids Screen Neg (NEG)    


 


Urine Ethyl Alcohol Neg (NEG)    


 


POC Urine HCG, Qualitative


 


 Hcg negative


(Negative) 





 


White Blood Count


 


 


 4.9 x10^3/uL


(4.0-11.0)


 


Red Blood Count


 


 


 3.64 x10^6/uL


(3.50-5.40)


 


Hemoglobin


 


 


 7.5 g/dL


(12.0-15.5)  L


 


Hematocrit


 


 


 23.8 %


(36.0-47.0)  L


 


Mean Corpuscular Volume


 


 


 65 fL ()


L


 


Mean Corpuscular Hemoglobin


 


 


 21 pg (25-35)


L


 


Mean Corpuscular Hemoglobin


Concent 


 


 31 g/dL


(31-37)


 


Red Cell Distribution Width


 


 


 17.7 %


(11.5-14.5)  H


 


Platelet Count


 


 


 502 x10^3/uL


(140-400)  H


 


Neutrophils (%) (Auto)   57 % (31-73)  


 


Lymphocytes (%) (Auto)   32 % (24-48)  


 


Monocytes (%) (Auto)   7 % (0-9)  


 


Eosinophils (%) (Auto)   3 % (0-3)  


 


Basophils (%) (Auto)   0 % (0-3)  


 


Neutrophils # (Auto)


 


 


 2.8 x10^3/uL


(1.8-7.7)


 


Lymphocytes # (Auto)


 


 


 1.6 x10^3/uL


(1.0-4.8)


 


Monocytes # (Auto)


 


 


 0.3 x10^3/uL


(0.0-1.1)


 


Eosinophils # (Auto)


 


 


 0.2 x10^3/uL


(0.0-0.7)


 


Basophils # (Auto)


 


 


 0.0 x10^3/uL


(0.0-0.2)


 


Platelet Estimate


 


 


 Increased


(ADEQUATE)


 


Polychromasia   Slight  


 


Anisocytosis   Slight  


 


Sodium Level


 


 


 139 mmol/L


(136-145)


 


Potassium Level


 


 


 3.6 mmol/L


(3.5-5.1)


 


Chloride Level


 


 


 104 mmol/L


()


 


Carbon Dioxide Level


 


 


 24 mmol/L


(21-32)


 


Anion Gap   11 (6-14)  


 


Blood Urea Nitrogen


 


 


 12 mg/dL


(7-20)


 


Creatinine


 


 


 0.8 mg/dL


(0.6-1.0)


 


Estimated GFR


(Cockcroft-Gault) 


 


 94.3  





 


BUN/Creatinine Ratio   15 (6-20)  


 


Glucose Level


 


 


 94 mg/dL


(70-99)


 


Calcium Level


 


 


 8.5 mg/dL


(8.5-10.1)


 


Magnesium Level


 


 


 1.7 mg/dL


(1.8-2.4)  L


 


Total Bilirubin


 


 


 0.2 mg/dL


(0.2-1.0)


 


Aspartate Amino Transferase


(AST) 


 


 18 U/L (15-37)





 


Alanine Aminotransferase (ALT)


 


 


 27 U/L (14-59)





 


Alkaline Phosphatase


 


 


 51 U/L


()


 


Creatine Kinase


 


 


 72 U/L


()


 


Troponin I Quantitative


 


 


 < 0.017 ng/mL


(0.000-0.055)


 


NT-Pro-B-Type Natriuretic


Peptide 


 


 305 pg/mL


(0-124)  H


 


Total Protein


 


 


 7.1 g/dL


(6.4-8.2)


 


Albumin


 


 


 3.2 g/dL


(3.4-5.0)  L


 


Albumin/Globulin Ratio


 


 


 0.8 (1.0-1.7)


L





                                Laboratory Tests


19 10:45








                                Laboratory Tests


19 10:45














EKG


EKG


[]





Radiology/Procedures


Radiology/Procedures


[][address] [address]


                                 [phone]


                                        


                                 IMAGING REPORT





                                     Signed





PATIENT: ZACHARIAH BOYD  ACCOUNT: [id number]     MRN#: [id number]


: 1975           LOCATION: ER              AGE: 44


SEX: F                    EXAM DT: 19         ACCESSION#: [id number]


STATUS: REG ER            ORD. PHYSICIAN: ALEXANDRE ESCAMILLA MD


REASON: edema


PROCEDURE: CHEST PA & LATERAL





EXAM: Chest, 2 views.


 


HISTORY: Edema.


 


COMPARISON: 2014.


 


FINDINGS: 2 views of the chest are obtained. There is no infiltrate, 


pleural effusion or pneumothorax. The heart is normal in size.


 


IMPRESSION: No acute pulmonary finding.


 


Electronically signed by: Melissa Muir MD (2019 11:17 AM) [place] [id number]














DICTATED and SIGNED BY:     MELISSA MUIR MD


DATE:     19 1117


                            [id number] [place] [address]


                                 [phone]


                                        


                                 IMAGING REPORT





                                     Signed





PATIENT: ZACHARIAH BOYD  ACCOUNT: [id number]     MRN#: [id number]


: 1975           LOCATION: ER              AGE: 44


SEX: F                    EXAM DT: 19         ACCESSION#: [id number]


STATUS: REG ER            ORD. PHYSICIAN: ALEXANDRE ESCAMILLA MD


REASON: blurred vision


PROCEDURE: CT HEAD WO CONTRAST





CT Head W/O Contrast:


 


History: Blurred vision


 


Comparison: 2017


 


Axial images were obtained without contrast.


 


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 There is also beam Foss 


artifact from prior coiling of the [place].


 


The remaining gray and white matter appears normal and symmetrical for the


patients age.  There is no mass effect, extraaxial fluid collections or 


hydrocephalus.  There is no gross bleed.  There is no focal loss of 


gray-white matter distinction to suggest acute ischemia, i.e. stroke.


 


Impression:  No acute findings.


 


PQRS Compliance Statement:


 


One or more of the following individualized dose reduction techniques were


utilized for this examination:  


1. Automated exposure control  


2. Adjustment of the mA and/or kV according to patient size  


3. Use of iterative reconstruction technique


 


Electronically signed by: Davina Casper III, MD (2019 11:08 AM) 


[facility]














DICTATED and SIGNED BY:     DAVINA CASPER III, MD


DATE:     19 1105





Course & Med Decision Making


Course & Med Decision Making


Pertinent Labs and Imaging studies reviewed. (See chart for details)





Evaluation of patient in ER showed 44-year-old female patient with multiple 

medical complaints including headache, blurred vision, facial edema, generalized

 weakness, hands and feet edema. Patient had unremarkable physical exam except 

for mild pallor. Patient felt better with pain medication in ER. Showed 

hemoglobin of 7.5 and patient stated she had heavy menstruation for a while  and

 sometimes she gets two menstruation in 1 month. Patient was advised to follow-

up with her OB/GYN and she already made an appointment with her OB/GYN for 

tomorrow.





I've spoken with the patient and/or caregivers. I've explained the patient's 

condition, diagnosis and treatment plan based on information available to me at 

this time. I've answered the patient's and/or caregivers questions and addressed

 any concerns. The patient and/or caregivers have a good understanding the 

patient's diagnosis, condition and treatment plan as can be expected at this 

point. Vital signs have been stabilized. The patient's condition is stable for 

discharge from the emergency department.





The patient will pursue further outpatient evaluation with her primary care 

provider or other designated consulting physician as outlined in the discharge 

instructions. Patient and/or caregivers are agreeable to this plan of care and 

follow-up instructions have been explained in detail. The patient and/or 

caregivers have received these instructions in written format and expressed 

understanding of these discharge instructions. The patient and her caregivers 

are aware that if any significant change in condition or worsening of symptoms 

should prompt him to immediately return to this of the closest emergency 

department.  If an emergent department is not readily available I would 

encourage him to call 911.





Malika Disclaimer


Dragon Disclaimer


This electronic medical record was generated, in whole or in part, using a voice

 recognition dictation system.





Departure


Departure


Impression:  


   Primary Impression:  


   Anemia


   Additional Impressions:  


   Menometrorrhagia


   Headache


Disposition:   HOME, SELF-CARE


Referrals:  


ANDERSON DILLARD (PCP)








YOLA MORALES MD


Patient Instructions:  Abnormal Uterine Bleeding, Anemia, FAQs, General Headache

 Without Cause





Additional Instructions:  


Drink plenty of liquids


Follow-up with your primary care physician in 3-5 days


Return to ER if not getting better


Follow-up with on-call OB/GYN or your OB/GYN in 2 or 3 days for evaluation and 

treatment of abnormal vaginal bleeding


Take over-the-counter iron pills 3 times a day with food


Scripts


Hydrocodone/Apap 5-325 (NORCO 5-325 TABLET) 1 Each Tablet


1 TAB PO PRN Q6HRS PRN for PAIN, #10 TAB 0 Refills


   Prov: ALEXANDRE ESCAMILLA MD         19





Problem Qualifiers








   Primary Impression:  


   Anemia


   Anemia type:  unspecified type  Qualified Codes:  D64.9 - Anemia, unspecified


   Additional Impressions:  


   Headache


   Headache type:  unspecified  Headache chronicity pattern:  unspecified pat

   tern  Intractability:  not intractable  Qualified Codes:  R51 - Headache








ALEXANDRE ESCAMILLA MD             Aug 20, 2019 12:34

## 2019-08-20 NOTE — EKG
[facility]

              [address] [place] [address]

Test Date:    2019               Test Time:    10:51:43

Pat Name:     ZACHARIAH BOYD            Department:   

Patient ID:   [id number]           Room:          

Gender:       F                        Technician:   

:          1975               Requested By: ALEXANDRE ESCAMILLA

Order Number: [id number]           Reading MD:   Adolph Maier

                                 Measurements

Intervals                              Axis          

Rate:         86                       P:            47

CO:           108                      QRS:          47

QRSD:         98                       T:            39

QT:           402                                    

QTc:          484                                    

                           Interpretive Statements

SINUS RHYTHM

NONSPECIFIC ST-T WAVE CHANGES.



Electronically Signed On 2019 10:07:49 CDT by Adolph Maier

## 2021-10-14 ENCOUNTER — HOSPITAL ENCOUNTER (EMERGENCY)
Dept: HOSPITAL 61 - ER | Age: 46
Discharge: HOME | End: 2021-10-14
Payer: MEDICAID

## 2021-10-14 VITALS — WEIGHT: 136.69 LBS | BODY MASS INDEX: 25.15 KG/M2 | HEIGHT: 62 IN

## 2021-10-14 VITALS — DIASTOLIC BLOOD PRESSURE: 76 MMHG | SYSTOLIC BLOOD PRESSURE: 119 MMHG

## 2021-10-14 DIAGNOSIS — I10: ICD-10-CM

## 2021-10-14 DIAGNOSIS — Z88.5: ICD-10-CM

## 2021-10-14 DIAGNOSIS — R50.9: ICD-10-CM

## 2021-10-14 DIAGNOSIS — E78.00: ICD-10-CM

## 2021-10-14 DIAGNOSIS — Z86.73: ICD-10-CM

## 2021-10-14 DIAGNOSIS — F41.9: Primary | ICD-10-CM

## 2021-10-14 DIAGNOSIS — Z88.1: ICD-10-CM

## 2021-10-14 DIAGNOSIS — Z20.822: ICD-10-CM

## 2021-10-14 PROCEDURE — U0003 INFECTIOUS AGENT DETECTION BY NUCLEIC ACID (DNA OR RNA); SEVERE ACUTE RESPIRATORY SYNDROME CORONAVIRUS 2 (SARS-COV-2) (CORONAVIRUS DISEASE [COVID-19]), AMPLIFIED PROBE TECHNIQUE, MAKING USE OF HIGH THROUGHPUT TECHNOLOGIES AS DESCRIBED BY CMS-2020-01-R: HCPCS

## 2021-10-14 PROCEDURE — 87426 SARSCOV CORONAVIRUS AG IA: CPT

## 2021-10-14 PROCEDURE — 99283 EMERGENCY DEPT VISIT LOW MDM: CPT

## 2021-10-14 NOTE — ED.ADGEN
Past Medical History


Past Medical History:  Anxiety, CVA, High Cholesterol, Hypertension, Seizure


Additional Past Medical Histor:  blood clot in brain with pregnancy


Past Surgical History:  Other


Additional Past Surgical Histo:  Blood clot removed from brain 18 years ago


Smoking Status:  Never Smoker


Alcohol Use:  None


Drug Use:  None





General Adult


EDM:


Chief Complaint:  FEVER





HPI:


HPI:





Patient is a 46-year-old female who arrives ambulatory to the emergency 

department complaining of 3 days of waking with fevers.  Patient reports most 

recently she woke with a fever of 102 today.  Patient states she did take 

ibuprofen prior to arrival and is now afebrile.  Patient states despite having 

fevers she has not had any symptoms other than fatigue otherwise.  Patient 

reports that she did have Covid 6 months previously and has yet to be 

vaccinated.  Patient states despite her symptoms she has not had any known sick 

contacts otherwise.  She further states she is not had any cough, congestion or 

shortness of air.  Moreover she denies any chest pain, headaches or neck 

stiffness.  She is awake, alert and nontoxic-appearing.





Review of Systems:


Review of Systems:


Constitutional:   Reports fever and fatigue. []


Eyes:   Denies change in visual acuity. []


HENT:   Denies nasal congestion or sore throat. [] 


Respiratory:   Denies cough or shortness of breath. [] 


Cardiovascular:   Denies chest pain or edema. [] 


GI:   Denies abdominal pain, nausea, vomiting, bloody stools or diarrhea. [] 


:  Denies dysuria. [] 


Musculoskeletal:   Denies back pain or joint pain. [] 


Integument:   Denies rash. [] 


Neurologic:   Denies headache, focal weakness or sensory changes. [] 


Endocrine:   Denies polyuria or polydipsia. [] 


Lymphatic:  Denies swollen glands. [] 


Psychiatric:  Denies depression or anxiety. []





Allergies:


Allergies:





Allergies








Coded Allergies Type Severity Reaction Last Updated Verified


 


  metoclopramide Allergy Severe Anaphylaxis 10/29/14 No


 


  morphine Allergy Intermediate itching 10/29/14 Yes











Physical Exam:


PE:





Constitutional: Well developed, well nourished, no acute distress, non-toxic 

appearance. []


HENT: Normocephalic, atraumatic, bilateral external ears normal, oropharynx 

moist, no oral exudates, nose normal. []


Eyes: PERRLA, EOMI, conjunctiva normal, no discharge. [] 


Neck: Normal range of motion, no tenderness, supple, no stridor. [] 


Cardiovascular:Heart rate regular rhythm, no murmur []


Lungs & Thorax:  Bilateral breath sounds clear to auscultation []


Abdomen: Bowel sounds normal, soft, no tenderness, no masses, no pulsatile 

masses. [] 


Skin: Warm, dry, no erythema, no rash. [] 


Back: No tenderness, no CVA tenderness. [] 


Extremities: No tenderness, no cyanosis, no clubbing, ROM intact, no edema. [] 


Neurologic: Alert and oriented X 3, normal motor function, normal sensory 

function, no focal deficits noted. []


Psychologic: Affect normal, judgement normal, mood normal. []





Current Patient Data:


Vital Signs:





                                   Vital Signs








  Date Time  Temp Pulse Resp B/P (MAP) Pulse Ox O2 Delivery O2 Flow Rate FiO2


 


10/14/21 09:16  95 18 119/76 (90) 98   


 


10/14/21 08:50 98.4       





 98.4       











EKG:


EKG:


[]





Heart Score:


C/O Chest Pain:  N/A


Risk Factors:


Risk Factors:  DM, Current or recent (<one month) smoker, HTN, HLP, family 

history of CAD, obesity.


Risk Scores:


Score 0 - 3:  2.5% MACE over next 6 weeks - Discharge Home


Score 4 - 6:  20.3% MACE over next 6 weeks - Admit for Clinical Observation


Score 7 - 10:  72.7% MACE over next 6 weeks - Early Invasive Strategies





Radiology/Procedures:


Radiology/Procedures:


[]





Course & Med Decision Making:


Course & Med Decision Making


Pertinent Labs and Imaging studies reviewed. (See chart for details)





[]





Dragon Disclaimer:


Dragon Disclaimer:


This electronic medical record was generated, in whole or in part, using a voice

 recognition dictation system.





Departure


Departure


Impression:  


   Primary Impression:  


   Anxiety about health


   Additional Impressions:  


   History of fever


   Person under investigation for COVID-19


Disposition:  01 HOME / SELF CARE / HOMELESS


Condition:  STABLE


Referrals:  


ANDERSON DILLARD (PCP)


Patient Instructions:  Fever of Unknown Origin, Fever, Adult





Additional Instructions:  


I have asked the patient to quarantine until results from her coronavirus 

testing are returned.  Should she develop any new shortness of air, chest pain 

or symptoms otherwise, she has been advised to return to the emergency 

department.





Problem Qualifiers











STEPHANIA PINEDO DO               Oct 14, 2021 09:15